# Patient Record
Sex: FEMALE | Race: ASIAN | NOT HISPANIC OR LATINO | URBAN - METROPOLITAN AREA
[De-identification: names, ages, dates, MRNs, and addresses within clinical notes are randomized per-mention and may not be internally consistent; named-entity substitution may affect disease eponyms.]

---

## 2017-07-10 ENCOUNTER — EMERGENCY (EMERGENCY)
Facility: HOSPITAL | Age: 37
LOS: 1 days | Discharge: HOME | End: 2017-07-10
Attending: EMERGENCY MEDICINE | Admitting: EMERGENCY MEDICINE
Payer: COMMERCIAL

## 2017-07-10 VITALS
HEART RATE: 95 BPM | TEMPERATURE: 98 F | SYSTOLIC BLOOD PRESSURE: 139 MMHG | OXYGEN SATURATION: 100 % | DIASTOLIC BLOOD PRESSURE: 94 MMHG | RESPIRATION RATE: 18 BRPM

## 2017-07-10 LAB
APTT BLD: 24 SEC — LOW (ref 27.5–37.4)
BASE EXCESS BLDV CALC-SCNC: 0.7 MMOL/L — SIGNIFICANT CHANGE UP
BASOPHILS # BLD AUTO: 0.05 K/UL — SIGNIFICANT CHANGE UP (ref 0–0.2)
BASOPHILS NFR BLD AUTO: 0.3 % — SIGNIFICANT CHANGE UP (ref 0–2)
BLOOD GAS VENOUS - CREATININE: 0.63 MG/DL — SIGNIFICANT CHANGE UP (ref 0.5–1.3)
CHLORIDE BLDV-SCNC: 99 MMOL/L — SIGNIFICANT CHANGE UP (ref 96–108)
EOSINOPHIL # BLD AUTO: 0.04 K/UL — SIGNIFICANT CHANGE UP (ref 0–0.5)
EOSINOPHIL NFR BLD AUTO: 0.3 % — SIGNIFICANT CHANGE UP (ref 0–6)
GAS PNL BLDV: 132 MMOL/L — LOW (ref 136–146)
GLUCOSE BLDV-MCNC: 92 — SIGNIFICANT CHANGE UP (ref 70–99)
HCO3 BLDV-SCNC: 23 MMOL/L — SIGNIFICANT CHANGE UP (ref 20–27)
HCT VFR BLD CALC: 41.1 % — SIGNIFICANT CHANGE UP (ref 34.5–45)
HCT VFR BLDV CALC: 42.6 % — SIGNIFICANT CHANGE UP (ref 34.5–45)
HGB BLD-MCNC: 13.9 G/DL — SIGNIFICANT CHANGE UP (ref 11.5–15.5)
HGB BLDV-MCNC: 13.9 G/DL — SIGNIFICANT CHANGE UP (ref 11.5–15.5)
IMM GRANULOCYTES # BLD AUTO: 0.09 # — SIGNIFICANT CHANGE UP
IMM GRANULOCYTES NFR BLD AUTO: 0.6 % — SIGNIFICANT CHANGE UP (ref 0–1.5)
INR BLD: 1.1 — SIGNIFICANT CHANGE UP (ref 0.88–1.17)
LACTATE BLDV-MCNC: 3.3 MMOL/L — HIGH (ref 0.5–2)
LYMPHOCYTES # BLD AUTO: 28.1 % — SIGNIFICANT CHANGE UP (ref 13–44)
LYMPHOCYTES # BLD AUTO: 4.28 K/UL — HIGH (ref 1–3.3)
MCHC RBC-ENTMCNC: 27.4 PG — SIGNIFICANT CHANGE UP (ref 27–34)
MCHC RBC-ENTMCNC: 33.8 % — SIGNIFICANT CHANGE UP (ref 32–36)
MCV RBC AUTO: 81.1 FL — SIGNIFICANT CHANGE UP (ref 80–100)
MONOCYTES # BLD AUTO: 0.76 K/UL — SIGNIFICANT CHANGE UP (ref 0–0.9)
MONOCYTES NFR BLD AUTO: 5 % — SIGNIFICANT CHANGE UP (ref 2–14)
NEUTROPHILS # BLD AUTO: 10.03 K/UL — HIGH (ref 1.8–7.4)
NEUTROPHILS NFR BLD AUTO: 65.7 % — SIGNIFICANT CHANGE UP (ref 43–77)
NRBC # FLD: 0 — SIGNIFICANT CHANGE UP
PCO2 BLDV: 52 MMHG — HIGH (ref 41–51)
PH BLDV: 7.32 PH — SIGNIFICANT CHANGE UP (ref 7.32–7.43)
PLATELET # BLD AUTO: 380 K/UL — SIGNIFICANT CHANGE UP (ref 150–400)
PMV BLD: 9.8 FL — SIGNIFICANT CHANGE UP (ref 7–13)
PO2 BLDV: 35 MMHG — SIGNIFICANT CHANGE UP (ref 35–40)
POTASSIUM BLDV-SCNC: 5.1 MMOL/L — HIGH (ref 3.4–4.5)
PROTHROM AB SERPL-ACNC: 12.3 SEC — SIGNIFICANT CHANGE UP (ref 9.8–13.1)
RBC # BLD: 5.07 M/UL — SIGNIFICANT CHANGE UP (ref 3.8–5.2)
RBC # FLD: 14.3 % — SIGNIFICANT CHANGE UP (ref 10.3–14.5)
REVIEW TO FOLLOW: YES — SIGNIFICANT CHANGE UP
SAO2 % BLDV: 51.6 % — LOW (ref 60–85)
WBC # BLD: 15.25 K/UL — HIGH (ref 3.8–10.5)
WBC # FLD AUTO: 15.25 K/UL — HIGH (ref 3.8–10.5)

## 2017-07-10 PROCEDURE — 99284 EMERGENCY DEPT VISIT MOD MDM: CPT

## 2017-07-10 RX ORDER — SODIUM CHLORIDE 9 MG/ML
1000 INJECTION INTRAMUSCULAR; INTRAVENOUS; SUBCUTANEOUS ONCE
Qty: 0 | Refills: 0 | Status: COMPLETED | OUTPATIENT
Start: 2017-07-10 | End: 2017-07-10

## 2017-07-10 RX ORDER — ONDANSETRON 8 MG/1
4 TABLET, FILM COATED ORAL ONCE
Qty: 0 | Refills: 0 | Status: COMPLETED | OUTPATIENT
Start: 2017-07-10 | End: 2017-07-10

## 2017-07-10 RX ORDER — SODIUM CHLORIDE 9 MG/ML
1000 INJECTION, SOLUTION INTRAVENOUS
Qty: 0 | Refills: 0 | Status: DISCONTINUED | OUTPATIENT
Start: 2017-07-10 | End: 2017-07-14

## 2017-07-10 RX ADMIN — ONDANSETRON 4 MILLIGRAM(S): 8 TABLET, FILM COATED ORAL at 22:51

## 2017-07-10 RX ADMIN — SODIUM CHLORIDE 1000 MILLILITER(S): 9 INJECTION INTRAMUSCULAR; INTRAVENOUS; SUBCUTANEOUS at 22:51

## 2017-07-10 NOTE — ED ADULT NURSE NOTE - CAS EDN DISCHARGE INTERVENTIONS
IV discontinued, cath removed intact/IV discontinued by MD Shore IV discontinued, cath removed intact

## 2017-07-10 NOTE — ED PROVIDER NOTE - OBJECTIVE STATEMENT
37 year old female , 8 weeks pregnant, in with 3-4 days of vomiting, unable to tolerate PO intake.  Sent in by Dr. cruz ob,gyn for dehydration and evaluation.  Denies any vaginal bleeding.  Does endorse some periumbilical/epigastric abdominal pain that started after the vomiting. 37 year old female , 8 weeks pregnant, in with 3-4 days of vomiting, unable to tolerate PO intake.  Sent in by Dr. cruz ob,gyn for dehydration and evaluation.  Denies any vaginal bleeding.  Does endorse some periumbilical/epigastric abdominal pain that started after the vomiting w/out radiation.    Patient denies cough, nasal congestion, abdominal pain, nausea, vomiting, recent antibiotic use, diarrhea, dysuria, urinary frequency, new rashes or injuries, headaches, neck stiffness, photophobia, and sick contacts. Pt is a 37 year old female , 8 weeks pregnant, in with 3-4 days of vomiting, unable to tolerate PO intake.  Sent in by Dr. cruz ob,gyn for dehydration and evaluation.  Denies any vaginal bleeding.  Does endorse some periumbilical/epigastric abdominal pain that started after the vomiting w/out radiation. No recent ETOH use, no vaginal discharge or bleeding.     Patient denies cough, nasal congestion, abdominal pain, nausea, vomiting, recent antibiotic use, diarrhea, dysuria, urinary frequency, new rashes or injuries, headaches, neck stiffness, photophobia, and sick contacts. Pt is a 37 year old female , 8 weeks pregnant, in with 3-4 days of vomiting, unable to tolerate PO intake.  Sent in by Dr. Watts ob-gyn for dehydration and evaluation.  Denies any vaginal bleeding.  Does endorse some periumbilical/epigastric abdominal pain that started after the vomiting w/out radiation. No recent ETOH use, no vaginal discharge or bleeding.     Patient denies cough, nasal congestion, abdominal pain, nausea, vomiting, recent antibiotic use, diarrhea, dysuria, urinary frequency, new rashes or injuries, headaches, neck stiffness, photophobia, and sick contacts.

## 2017-07-10 NOTE — ED PROVIDER NOTE - PLAN OF CARE
Follow-up with OB and take Diclegis as needed for nausea and vomiting. You were seen at the [facility] ER for abdominal pain nausea and vomiting. Your White Blood cells were slightly elevated likely due to pregnancy, but normal MRI w/out appendicitis. You are to follow-up with your OB in the next week for further evaluation. You are also to take an anti-nausea medication as needed and as prescribed. You are to return for persistent nausea and vomiting, abdominal pain, fevers or chills, or any other concerns.

## 2017-07-10 NOTE — ED ADULT NURSE NOTE - OBJECTIVE STATEMENT
Patient received in room 13. Pt. is A&O x3 and ambulatory at baseline. Pt. states she is 8 weeks pregnant and is c/o vomiting for the past 3-4 days with decreased PO intake. Pt. denies vaginal bleeding and cramping. Pt. has epigastric abdominal pain upon palpation with no radiation. Pt. denies SOB, chest pain, dizziness, weakness, urinary frequency, pain upon urination. 22 gauge IV inserted in right hand by KETAN Moctezuma. Labs drawn and sent. Family at bedside. VS as noted. MD heaton in progress. Will continue to monitor.

## 2017-07-10 NOTE — ED ADULT TRIAGE NOTE - CHIEF COMPLAINT QUOTE
states" I am vomiting since Thursday and I am 8 weeks pregnant" c/o pain to abdomen under belly button going to the back.

## 2017-07-10 NOTE — ED PROVIDER NOTE - PROGRESS NOTE DETAILS
MRI negative for appendicitis. Patient tolerated PO trial and feels much better. patient will be d/aminata with PRN nausea medication.

## 2017-07-10 NOTE — ED PROVIDER NOTE - CARE PLAN
Principal Discharge DX:	Gastroenteritis  Goal:	Follow-up with OB and take Diclegis as needed for nausea and vomiting.  Instructions for follow-up, activity and diet:	You were seen at the [facility] ER for abdominal pain nausea and vomiting. Your White Blood cells were slightly elevated likely due to pregnancy, but normal MRI w/out appendicitis. You are to follow-up with your OB in the next week for further evaluation. You are also to take an anti-nausea medication as needed and as prescribed. You are to return for persistent nausea and vomiting, abdominal pain, fevers or chills, or any other concerns.  Secondary Diagnosis:	Hyperemesis gravidarum Principal Discharge DX:	Gastroenteritis  Goal:	Follow-up with OB and take Diclegis as needed for nausea and vomiting.  Instructions for follow-up, activity and diet:	You were seen at the [facility] ER for abdominal pain nausea and vomiting. Your White Blood cells were slightly elevated likely due to pregnancy, but normal MRI w/out appendicitis. You are to follow-up with your OB in the next week for further evaluation. You are also to take an anti-nausea medication as needed and as prescribed. You are to return for persistent nausea and vomiting, abdominal pain, fevers or chills, or any other concerns.  Secondary Diagnosis:	Hyperemesis gravidarum

## 2017-07-10 NOTE — ED PROVIDER NOTE - ATTENDING CONTRIBUTION TO CARE
I, Ronda Larios M.D. have examined the patient and confirmed the essential components of the history, physical examination, diagnosis, and treatment plan. I agree with the patient's care as documented by the resident and amended herein by me. See note above for complete details of service.  37-year-old female  eight weeks referred by OB for intractable nausea and vomiting x several days. Pain is very umbilical and epigastric. No vaginal bleeding or discharge. Exam reveals epigastric, right upper and lower quadrant tenderness to palpation. Plan – supportive and symptomatic care. Labs. Urinalysis. Ultrasound imaging rule out biliary disease or appendicitis. If ultrasound nondiagnostic will perform MRI rule out appendicitis. Reassess.

## 2017-07-11 VITALS
DIASTOLIC BLOOD PRESSURE: 67 MMHG | HEART RATE: 74 BPM | OXYGEN SATURATION: 100 % | TEMPERATURE: 98 F | SYSTOLIC BLOOD PRESSURE: 107 MMHG | RESPIRATION RATE: 16 BRPM

## 2017-07-11 LAB
ALBUMIN SERPL ELPH-MCNC: 4.1 G/DL — SIGNIFICANT CHANGE UP (ref 3.3–5)
ALP SERPL-CCNC: 59 U/L — SIGNIFICANT CHANGE UP (ref 40–120)
ALT FLD-CCNC: 30 U/L — SIGNIFICANT CHANGE UP (ref 4–33)
APPEARANCE UR: SIGNIFICANT CHANGE UP
AST SERPL-CCNC: 32 U/L — SIGNIFICANT CHANGE UP (ref 4–32)
BILIRUB SERPL-MCNC: 0.5 MG/DL — SIGNIFICANT CHANGE UP (ref 0.2–1.2)
BILIRUB UR-MCNC: NEGATIVE — SIGNIFICANT CHANGE UP
BLD GP AB SCN SERPL QL: NEGATIVE — SIGNIFICANT CHANGE UP
BLOOD UR QL VISUAL: HIGH
BUN SERPL-MCNC: 9 MG/DL — SIGNIFICANT CHANGE UP (ref 7–23)
CALCIUM SERPL-MCNC: 9.5 MG/DL — SIGNIFICANT CHANGE UP (ref 8.4–10.5)
CHLORIDE SERPL-SCNC: 95 MMOL/L — LOW (ref 98–107)
CO2 SERPL-SCNC: 19 MMOL/L — LOW (ref 22–31)
COLOR SPEC: YELLOW — SIGNIFICANT CHANGE UP
CREAT SERPL-MCNC: 0.67 MG/DL — SIGNIFICANT CHANGE UP (ref 0.5–1.3)
GLUCOSE SERPL-MCNC: 92 MG/DL — SIGNIFICANT CHANGE UP (ref 70–99)
GLUCOSE UR-MCNC: NEGATIVE — SIGNIFICANT CHANGE UP
HCG SERPL-ACNC: SIGNIFICANT CHANGE UP MIU/ML
KETONES UR-MCNC: SIGNIFICANT CHANGE UP
LEUKOCYTE ESTERASE UR-ACNC: NEGATIVE — SIGNIFICANT CHANGE UP
LIDOCAIN IGE QN: 28.7 U/L — SIGNIFICANT CHANGE UP (ref 7–60)
MUCOUS THREADS # UR AUTO: SIGNIFICANT CHANGE UP
NITRITE UR-MCNC: NEGATIVE — SIGNIFICANT CHANGE UP
PH UR: 5.5 — SIGNIFICANT CHANGE UP (ref 4.6–8)
POTASSIUM SERPL-MCNC: 4.4 MMOL/L — SIGNIFICANT CHANGE UP (ref 3.5–5.3)
POTASSIUM SERPL-SCNC: 4.4 MMOL/L — SIGNIFICANT CHANGE UP (ref 3.5–5.3)
PROT SERPL-MCNC: 8 G/DL — SIGNIFICANT CHANGE UP (ref 6–8.3)
PROT UR-MCNC: 100 — HIGH
RBC CASTS # UR COMP ASSIST: SIGNIFICANT CHANGE UP (ref 0–?)
RH IG SCN BLD-IMP: POSITIVE — SIGNIFICANT CHANGE UP
SODIUM SERPL-SCNC: 137 MMOL/L — SIGNIFICANT CHANGE UP (ref 135–145)
SP GR SPEC: 1.03 — HIGH (ref 1–1.03)
SQUAMOUS # UR AUTO: SIGNIFICANT CHANGE UP
UROBILINOGEN FLD QL: 1 E.U. — SIGNIFICANT CHANGE UP (ref 0.1–0.2)
WBC UR QL: HIGH (ref 0–?)

## 2017-07-11 PROCEDURE — 76830 TRANSVAGINAL US NON-OB: CPT | Mod: 26

## 2017-07-11 PROCEDURE — 74181 MRI ABDOMEN W/O CONTRAST: CPT | Mod: 26

## 2017-07-11 PROCEDURE — 76705 ECHO EXAM OF ABDOMEN: CPT | Mod: 26,76

## 2017-07-11 RX ORDER — ONDANSETRON 8 MG/1
1 TABLET, FILM COATED ORAL
Qty: 9 | Refills: 0 | OUTPATIENT
Start: 2017-07-11 | End: 2017-07-14

## 2017-07-11 RX ADMIN — SODIUM CHLORIDE 100 MILLILITER(S): 9 INJECTION, SOLUTION INTRAVENOUS at 01:00

## 2017-07-12 LAB
BACTERIA UR CULT: SIGNIFICANT CHANGE UP
SPECIMEN SOURCE: SIGNIFICANT CHANGE UP

## 2017-07-13 NOTE — ED POST DISCHARGE NOTE - OTHER COMMUNICATION
Discussed results with patient.  Pt states her OBGYN is Dr. Zhong office # [phone].  Faxed results to office [phone].  Pt will contact OBGYN and discuss UCX results.

## 2017-07-21 PROBLEM — Z00.00 ENCOUNTER FOR PREVENTIVE HEALTH EXAMINATION: Status: ACTIVE | Noted: 2017-07-21

## 2017-08-10 ENCOUNTER — APPOINTMENT (OUTPATIENT)
Dept: ANTEPARTUM | Facility: CLINIC | Age: 37
End: 2017-08-10

## 2017-11-21 ENCOUNTER — OUTPATIENT (OUTPATIENT)
Dept: OUTPATIENT SERVICES | Facility: HOSPITAL | Age: 37
LOS: 1 days | End: 2017-11-21
Payer: COMMERCIAL

## 2017-11-21 DIAGNOSIS — Z3A.00 WEEKS OF GESTATION OF PREGNANCY NOT SPECIFIED: ICD-10-CM

## 2017-11-21 DIAGNOSIS — O26.899 OTHER SPECIFIED PREGNANCY RELATED CONDITIONS, UNSPECIFIED TRIMESTER: ICD-10-CM

## 2017-11-21 LAB
ALBUMIN SERPL ELPH-MCNC: 3.4 G/DL — SIGNIFICANT CHANGE UP (ref 3.3–5)
ALP SERPL-CCNC: 64 U/L — SIGNIFICANT CHANGE UP (ref 40–120)
ALT FLD-CCNC: 16 U/L — SIGNIFICANT CHANGE UP (ref 4–33)
APPEARANCE UR: CLEAR — SIGNIFICANT CHANGE UP
APTT BLD: 23.3 SEC — LOW (ref 27.5–37.4)
AST SERPL-CCNC: 13 U/L — SIGNIFICANT CHANGE UP (ref 4–32)
BASOPHILS # BLD AUTO: 0.06 K/UL — SIGNIFICANT CHANGE UP (ref 0–0.2)
BASOPHILS NFR BLD AUTO: 0.4 % — SIGNIFICANT CHANGE UP (ref 0–2)
BILIRUB SERPL-MCNC: 0.2 MG/DL — SIGNIFICANT CHANGE UP (ref 0.2–1.2)
BILIRUB UR-MCNC: NEGATIVE — SIGNIFICANT CHANGE UP
BLOOD UR QL VISUAL: HIGH
BUN SERPL-MCNC: 9 MG/DL — SIGNIFICANT CHANGE UP (ref 7–23)
CALCIUM SERPL-MCNC: 8.3 MG/DL — LOW (ref 8.4–10.5)
CHLORIDE SERPL-SCNC: 102 MMOL/L — SIGNIFICANT CHANGE UP (ref 98–107)
CO2 SERPL-SCNC: 24 MMOL/L — SIGNIFICANT CHANGE UP (ref 22–31)
COLOR SPEC: YELLOW — SIGNIFICANT CHANGE UP
CREAT ?TM UR-MCNC: 313.61 MG/DL — SIGNIFICANT CHANGE UP
CREAT SERPL-MCNC: 0.71 MG/DL — SIGNIFICANT CHANGE UP (ref 0.5–1.3)
EOSINOPHIL # BLD AUTO: 0.18 K/UL — SIGNIFICANT CHANGE UP (ref 0–0.5)
EOSINOPHIL NFR BLD AUTO: 1.3 % — SIGNIFICANT CHANGE UP (ref 0–6)
FIBRINOGEN PPP-MCNC: 530 MG/DL — HIGH (ref 310–510)
GLUCOSE SERPL-MCNC: 106 MG/DL — HIGH (ref 70–99)
GLUCOSE UR-MCNC: 100 — SIGNIFICANT CHANGE UP
HCT VFR BLD CALC: 32.1 % — LOW (ref 34.5–45)
HGB BLD-MCNC: 10.6 G/DL — LOW (ref 11.5–15.5)
IMM GRANULOCYTES # BLD AUTO: 0.3 # — SIGNIFICANT CHANGE UP
IMM GRANULOCYTES NFR BLD AUTO: 2.1 % — HIGH (ref 0–1.5)
INR BLD: 1.01 — SIGNIFICANT CHANGE UP (ref 0.88–1.17)
KETONES UR-MCNC: SIGNIFICANT CHANGE UP
LDH SERPL L TO P-CCNC: 191 U/L — SIGNIFICANT CHANGE UP (ref 135–225)
LEUKOCYTE ESTERASE UR-ACNC: HIGH
LYMPHOCYTES # BLD AUTO: 23.5 % — SIGNIFICANT CHANGE UP (ref 13–44)
LYMPHOCYTES # BLD AUTO: 3.32 K/UL — HIGH (ref 1–3.3)
MCHC RBC-ENTMCNC: 26.4 PG — LOW (ref 27–34)
MCHC RBC-ENTMCNC: 33 % — SIGNIFICANT CHANGE UP (ref 32–36)
MCV RBC AUTO: 80 FL — SIGNIFICANT CHANGE UP (ref 80–100)
MONOCYTES # BLD AUTO: 0.79 K/UL — SIGNIFICANT CHANGE UP (ref 0–0.9)
MONOCYTES NFR BLD AUTO: 5.6 % — SIGNIFICANT CHANGE UP (ref 2–14)
MUCOUS THREADS # UR AUTO: SIGNIFICANT CHANGE UP
NEUTROPHILS # BLD AUTO: 9.47 K/UL — HIGH (ref 1.8–7.4)
NEUTROPHILS NFR BLD AUTO: 67.1 % — SIGNIFICANT CHANGE UP (ref 43–77)
NITRITE UR-MCNC: NEGATIVE — SIGNIFICANT CHANGE UP
NON-SQ EPI CELLS # UR AUTO: <1 — SIGNIFICANT CHANGE UP
NRBC # FLD: 0 — SIGNIFICANT CHANGE UP
PH UR: 6 — SIGNIFICANT CHANGE UP (ref 4.6–8)
PLATELET # BLD AUTO: 321 K/UL — SIGNIFICANT CHANGE UP (ref 150–400)
PMV BLD: 9.7 FL — SIGNIFICANT CHANGE UP (ref 7–13)
POTASSIUM SERPL-MCNC: 4 MMOL/L — SIGNIFICANT CHANGE UP (ref 3.5–5.3)
POTASSIUM SERPL-SCNC: 4 MMOL/L — SIGNIFICANT CHANGE UP (ref 3.5–5.3)
PROT SERPL-MCNC: 6.9 G/DL — SIGNIFICANT CHANGE UP (ref 6–8.3)
PROT UR-MCNC: 100 — HIGH
PROT UR-MCNC: 34.6 MG/DL — SIGNIFICANT CHANGE UP
PROTHROM AB SERPL-ACNC: 11.3 SEC — SIGNIFICANT CHANGE UP (ref 9.8–13.1)
RBC # BLD: 4.01 M/UL — SIGNIFICANT CHANGE UP (ref 3.8–5.2)
RBC # FLD: 14.3 % — SIGNIFICANT CHANGE UP (ref 10.3–14.5)
RBC CASTS # UR COMP ASSIST: HIGH (ref 0–?)
SODIUM SERPL-SCNC: 138 MMOL/L — SIGNIFICANT CHANGE UP (ref 135–145)
SP GR SPEC: 1.04 — HIGH (ref 1–1.03)
SQUAMOUS # UR AUTO: SIGNIFICANT CHANGE UP
URATE SERPL-MCNC: 2.6 MG/DL — SIGNIFICANT CHANGE UP (ref 2.5–7)
UROBILINOGEN FLD QL: NORMAL E.U. — SIGNIFICANT CHANGE UP (ref 0.1–0.2)
WBC # BLD: 14.12 K/UL — HIGH (ref 3.8–10.5)
WBC # FLD AUTO: 14.12 K/UL — HIGH (ref 3.8–10.5)
WBC UR QL: HIGH (ref 0–?)

## 2017-11-21 PROCEDURE — 99214 OFFICE O/P EST MOD 30 MIN: CPT

## 2017-11-23 LAB
BACTERIA UR CULT: SIGNIFICANT CHANGE UP
SPECIMEN SOURCE: SIGNIFICANT CHANGE UP

## 2017-11-26 ENCOUNTER — TRANSCRIPTION ENCOUNTER (OUTPATIENT)
Age: 37
End: 2017-11-26

## 2018-02-24 ENCOUNTER — INPATIENT (INPATIENT)
Facility: HOSPITAL | Age: 38
LOS: 0 days | Discharge: HOME | End: 2018-02-25
Attending: OBSTETRICS & GYNECOLOGY | Admitting: OBSTETRICS & GYNECOLOGY

## 2018-02-24 VITALS — WEIGHT: 227.08 LBS | HEIGHT: 68 IN

## 2018-02-24 DIAGNOSIS — O48.0 POST-TERM PREGNANCY: ICD-10-CM

## 2018-02-24 LAB
ANISOCYTOSIS BLD QL: SIGNIFICANT CHANGE UP
BASOPHILS # BLD AUTO: 0.06 K/UL — SIGNIFICANT CHANGE UP (ref 0–0.2)
BASOPHILS NFR BLD AUTO: 0.6 % — SIGNIFICANT CHANGE UP (ref 0–2)
BASOPHILS NFR SPEC: 0.9 % — SIGNIFICANT CHANGE UP (ref 0–2)
BLD GP AB SCN SERPL QL: NEGATIVE — SIGNIFICANT CHANGE UP
EOSINOPHIL # BLD AUTO: 0.14 K/UL — SIGNIFICANT CHANGE UP (ref 0–0.5)
EOSINOPHIL NFR BLD AUTO: 1.4 % — SIGNIFICANT CHANGE UP (ref 0–6)
EOSINOPHIL NFR FLD: 3.8 % — SIGNIFICANT CHANGE UP (ref 0–6)
GIANT PLATELETS BLD QL SMEAR: PRESENT — SIGNIFICANT CHANGE UP
GLUCOSE BLDC GLUCOMTR-MCNC: 127 MG/DL — HIGH (ref 70–99)
GLUCOSE BLDC GLUCOMTR-MCNC: 61 MG/DL — LOW (ref 70–99)
GLUCOSE BLDC GLUCOMTR-MCNC: 78 MG/DL — SIGNIFICANT CHANGE UP (ref 70–99)
GLUCOSE BLDC GLUCOMTR-MCNC: 86 MG/DL — SIGNIFICANT CHANGE UP (ref 70–99)
HCT VFR BLD CALC: 30.2 % — LOW (ref 34.5–45)
HGB BLD-MCNC: 9.7 G/DL — LOW (ref 11.5–15.5)
IMM GRANULOCYTES # BLD AUTO: 0.22 # — SIGNIFICANT CHANGE UP
IMM GRANULOCYTES NFR BLD AUTO: 2.1 % — HIGH (ref 0–1.5)
LYMPHOCYTES # BLD AUTO: 3.23 K/UL — SIGNIFICANT CHANGE UP (ref 1–3.3)
LYMPHOCYTES # BLD AUTO: 31.1 % — SIGNIFICANT CHANGE UP (ref 13–44)
LYMPHOCYTES NFR SPEC AUTO: 23.8 % — SIGNIFICANT CHANGE UP (ref 13–44)
MCHC RBC-ENTMCNC: 23.6 PG — LOW (ref 27–34)
MCHC RBC-ENTMCNC: 32.1 % — SIGNIFICANT CHANGE UP (ref 32–36)
MCV RBC AUTO: 73.5 FL — LOW (ref 80–100)
MICROCYTES BLD QL: SLIGHT — SIGNIFICANT CHANGE UP
MONOCYTES # BLD AUTO: 0.46 K/UL — SIGNIFICANT CHANGE UP (ref 0–0.9)
MONOCYTES NFR BLD AUTO: 4.4 % — SIGNIFICANT CHANGE UP (ref 2–14)
MONOCYTES NFR BLD: 2.9 % — SIGNIFICANT CHANGE UP (ref 2–9)
MYELOCYTES NFR BLD: 1 % — HIGH (ref 0–0)
NEUTROPHIL AB SER-ACNC: 59 % — SIGNIFICANT CHANGE UP (ref 43–77)
NEUTROPHILS # BLD AUTO: 6.26 K/UL — SIGNIFICANT CHANGE UP (ref 1.8–7.4)
NEUTROPHILS NFR BLD AUTO: 60.4 % — SIGNIFICANT CHANGE UP (ref 43–77)
NEUTS BAND # BLD: 1 % — SIGNIFICANT CHANGE UP (ref 0–6)
NRBC # FLD: 0 — SIGNIFICANT CHANGE UP
PLATELET # BLD AUTO: 381 K/UL — SIGNIFICANT CHANGE UP (ref 150–400)
PLATELET COUNT - ESTIMATE: NORMAL — SIGNIFICANT CHANGE UP
PMV BLD: 10.7 FL — SIGNIFICANT CHANGE UP (ref 7–13)
POIKILOCYTOSIS BLD QL AUTO: SLIGHT — SIGNIFICANT CHANGE UP
RBC # BLD: 4.11 M/UL — SIGNIFICANT CHANGE UP (ref 3.8–5.2)
RBC # FLD: 18 % — HIGH (ref 10.3–14.5)
RH IG SCN BLD-IMP: POSITIVE — SIGNIFICANT CHANGE UP
SMUDGE CELLS # BLD: PRESENT — SIGNIFICANT CHANGE UP
T PALLIDUM AB TITR SER: NEGATIVE — SIGNIFICANT CHANGE UP
VARIANT LYMPHS # BLD: 7.6 % — SIGNIFICANT CHANGE UP
WBC # BLD: 10.37 K/UL — SIGNIFICANT CHANGE UP (ref 3.8–10.5)
WBC # FLD AUTO: 10.37 K/UL — SIGNIFICANT CHANGE UP (ref 3.8–10.5)

## 2018-02-24 RX ORDER — CITRIC ACID/SODIUM CITRATE 300-500 MG
15 SOLUTION, ORAL ORAL EVERY 4 HOURS
Qty: 0 | Refills: 0 | Status: DISCONTINUED | OUTPATIENT
Start: 2018-02-24 | End: 2018-02-25

## 2018-02-24 RX ORDER — AMPICILLIN TRIHYDRATE 250 MG
CAPSULE ORAL
Qty: 0 | Refills: 0 | Status: DISCONTINUED | OUTPATIENT
Start: 2018-02-24 | End: 2018-02-25

## 2018-02-24 RX ORDER — AMPICILLIN TRIHYDRATE 250 MG
1 CAPSULE ORAL EVERY 4 HOURS
Qty: 0 | Refills: 0 | Status: DISCONTINUED | OUTPATIENT
Start: 2018-02-24 | End: 2018-02-25

## 2018-02-24 RX ORDER — MONTELUKAST 4 MG/1
10 TABLET, CHEWABLE ORAL DAILY
Qty: 0 | Refills: 0 | Status: DISCONTINUED | OUTPATIENT
Start: 2018-02-24 | End: 2018-02-25

## 2018-02-24 RX ORDER — SODIUM CHLORIDE 9 MG/ML
1000 INJECTION, SOLUTION INTRAVENOUS
Qty: 0 | Refills: 0 | Status: DISCONTINUED | OUTPATIENT
Start: 2018-02-24 | End: 2018-02-25

## 2018-02-24 RX ORDER — OXYTOCIN 10 UNIT/ML
333.33 VIAL (ML) INJECTION
Qty: 20 | Refills: 0 | Status: DISCONTINUED | OUTPATIENT
Start: 2018-02-24 | End: 2018-02-25

## 2018-02-24 RX ORDER — AMPICILLIN TRIHYDRATE 250 MG
2 CAPSULE ORAL ONCE
Qty: 0 | Refills: 0 | Status: COMPLETED | OUTPATIENT
Start: 2018-02-24 | End: 2018-02-24

## 2018-02-24 RX ORDER — SODIUM CHLORIDE 9 MG/ML
1000 INJECTION, SOLUTION INTRAVENOUS ONCE
Qty: 0 | Refills: 0 | Status: DISCONTINUED | OUTPATIENT
Start: 2018-02-24 | End: 2018-02-25

## 2018-02-24 RX ORDER — ALBUTEROL 90 UG/1
2 AEROSOL, METERED ORAL EVERY 6 HOURS
Qty: 0 | Refills: 0 | Status: DISCONTINUED | OUTPATIENT
Start: 2018-02-24 | End: 2018-02-25

## 2018-02-24 RX ADMIN — Medication 208 GRAM(S): at 18:16

## 2018-02-24 RX ADMIN — Medication 15 MILLILITER(S): at 15:11

## 2018-02-24 RX ADMIN — Medication 208 GRAM(S): at 22:20

## 2018-02-24 RX ADMIN — Medication 216 GRAM(S): at 14:17

## 2018-02-24 RX ADMIN — SODIUM CHLORIDE 250 MILLILITER(S): 9 INJECTION, SOLUTION INTRAVENOUS at 14:01

## 2018-02-25 ENCOUNTER — TRANSCRIPTION ENCOUNTER (OUTPATIENT)
Age: 38
End: 2018-02-25

## 2018-02-25 LAB
GLUCOSE BLDC GLUCOMTR-MCNC: 89 MG/DL — SIGNIFICANT CHANGE UP (ref 70–99)
GLUCOSE BLDC GLUCOMTR-MCNC: 90 MG/DL — SIGNIFICANT CHANGE UP (ref 70–99)

## 2018-02-25 RX ORDER — ALBUTEROL 90 UG/1
2 AEROSOL, METERED ORAL
Qty: 0 | Refills: 0 | COMMUNITY
Start: 2018-02-25

## 2018-02-25 RX ORDER — SODIUM CHLORIDE 9 MG/ML
1000 INJECTION, SOLUTION INTRAVENOUS
Qty: 0 | Refills: 0 | Status: DISCONTINUED | OUTPATIENT
Start: 2018-02-25 | End: 2018-02-25

## 2018-02-25 RX ORDER — MONTELUKAST 4 MG/1
1 TABLET, CHEWABLE ORAL
Qty: 0 | Refills: 0 | COMMUNITY
Start: 2018-02-25

## 2018-02-25 RX ADMIN — Medication 208 GRAM(S): at 02:20

## 2018-02-25 RX ADMIN — MONTELUKAST 10 MILLIGRAM(S): 4 TABLET, CHEWABLE ORAL at 13:16

## 2018-02-25 RX ADMIN — Medication 208 GRAM(S): at 14:02

## 2018-02-25 RX ADMIN — Medication 208 GRAM(S): at 10:01

## 2018-02-25 RX ADMIN — Medication 208 GRAM(S): at 06:01

## 2018-02-25 NOTE — DISCHARGE NOTE ANTEPARTUM - HOSPITAL COURSE
37  at 40.1 admitted for IOL for GDMA1, FS well controlled, failed induction, sent home to return for induction at later date.

## 2018-02-25 NOTE — DISCHARGE NOTE ANTEPARTUM - CARE PLAN
Principal Discharge DX:	Diet controlled gestational diabetes mellitus (GDM) in third trimester  Goal:	wellness  Assessment and plan of treatment:	Follow up for induction at later date, as per Dr. Nowak

## 2018-02-25 NOTE — DISCHARGE NOTE ANTEPARTUM - CARE PROVIDER_API CALL
Yovany Nowak (MANNY), MaternalFetal Medicine; Obstetrics and Gynecology  [address] [place] [address]  [address]  [place] [address]  Phone: [phone]  Fax: [phone]

## 2018-02-25 NOTE — DISCHARGE NOTE ANTEPARTUM - PATIENT PORTAL LINK FT
You can access the [email or web address] Patient Portal, offered by [facility], by registering with the following website: [email or web address]

## 2018-02-27 ENCOUNTER — INPATIENT (INPATIENT)
Facility: HOSPITAL | Age: 38
LOS: 2 days | Discharge: HOME | End: 2018-03-02
Attending: OBSTETRICS & GYNECOLOGY | Admitting: OBSTETRICS & GYNECOLOGY

## 2018-02-27 DIAGNOSIS — O26.899 OTHER SPECIFIED PREGNANCY RELATED CONDITIONS, UNSPECIFIED TRIMESTER: ICD-10-CM

## 2018-02-27 RX ORDER — SODIUM CHLORIDE 9 MG/ML
1000 INJECTION, SOLUTION INTRAVENOUS
Qty: 0 | Refills: 0 | Status: DISCONTINUED | OUTPATIENT
Start: 2018-02-27 | End: 2018-02-28

## 2018-02-27 RX ORDER — AMPICILLIN TRIHYDRATE 250 MG
CAPSULE ORAL
Qty: 0 | Refills: 0 | Status: DISCONTINUED | OUTPATIENT
Start: 2018-02-28 | End: 2018-02-28

## 2018-02-27 RX ORDER — SODIUM CHLORIDE 9 MG/ML
1000 INJECTION, SOLUTION INTRAVENOUS
Qty: 0 | Refills: 0 | Status: DISCONTINUED | OUTPATIENT
Start: 2018-02-27 | End: 2018-03-01

## 2018-02-27 RX ORDER — AMPICILLIN TRIHYDRATE 250 MG
1 CAPSULE ORAL EVERY 4 HOURS
Qty: 0 | Refills: 0 | Status: DISCONTINUED | OUTPATIENT
Start: 2018-02-28 | End: 2018-02-28

## 2018-02-27 RX ORDER — OXYTOCIN 10 UNIT/ML
333.33 VIAL (ML) INJECTION
Qty: 20 | Refills: 0 | Status: DISCONTINUED | OUTPATIENT
Start: 2018-02-27 | End: 2018-02-28

## 2018-02-27 RX ORDER — SODIUM CHLORIDE 9 MG/ML
1000 INJECTION, SOLUTION INTRAVENOUS ONCE
Qty: 0 | Refills: 0 | Status: DISCONTINUED | OUTPATIENT
Start: 2018-02-27 | End: 2018-02-28

## 2018-02-27 RX ORDER — AMPICILLIN TRIHYDRATE 250 MG
2 CAPSULE ORAL ONCE
Qty: 0 | Refills: 0 | Status: COMPLETED | OUTPATIENT
Start: 2018-02-27 | End: 2018-02-28

## 2018-02-28 VITALS — WEIGHT: 227.08 LBS | HEIGHT: 68 IN

## 2018-02-28 LAB
ALBUMIN SERPL ELPH-MCNC: 3.3 G/DL — SIGNIFICANT CHANGE UP (ref 3.3–5)
ALP SERPL-CCNC: 143 U/L — HIGH (ref 40–120)
ALT FLD-CCNC: 9 U/L — SIGNIFICANT CHANGE UP (ref 4–33)
APPEARANCE UR: SIGNIFICANT CHANGE UP
APTT BLD: 23.6 SEC — LOW (ref 27.5–37.4)
AST SERPL-CCNC: 14 U/L — SIGNIFICANT CHANGE UP (ref 4–32)
BASOPHILS # BLD AUTO: 0.05 K/UL — SIGNIFICANT CHANGE UP (ref 0–0.2)
BASOPHILS NFR BLD AUTO: 0.4 % — SIGNIFICANT CHANGE UP (ref 0–2)
BILIRUB SERPL-MCNC: < 0.2 MG/DL — LOW (ref 0.2–1.2)
BILIRUB UR-MCNC: NEGATIVE — SIGNIFICANT CHANGE UP
BLD GP AB SCN SERPL QL: NEGATIVE — SIGNIFICANT CHANGE UP
BLOOD UR QL VISUAL: HIGH
BUN SERPL-MCNC: 9 MG/DL — SIGNIFICANT CHANGE UP (ref 7–23)
CALCIUM SERPL-MCNC: 8.3 MG/DL — LOW (ref 8.4–10.5)
CHLORIDE SERPL-SCNC: 103 MMOL/L — SIGNIFICANT CHANGE UP (ref 98–107)
CO2 SERPL-SCNC: 23 MMOL/L — SIGNIFICANT CHANGE UP (ref 22–31)
COLOR SPEC: YELLOW — SIGNIFICANT CHANGE UP
CREAT SERPL-MCNC: 0.62 MG/DL — SIGNIFICANT CHANGE UP (ref 0.5–1.3)
EOSINOPHIL # BLD AUTO: 0.22 K/UL — SIGNIFICANT CHANGE UP (ref 0–0.5)
EOSINOPHIL NFR BLD AUTO: 1.8 % — SIGNIFICANT CHANGE UP (ref 0–6)
FIBRINOGEN PPP-MCNC: 495.5 MG/DL — SIGNIFICANT CHANGE UP (ref 310–510)
GLUCOSE BLDC GLUCOMTR-MCNC: 107 MG/DL — HIGH (ref 70–99)
GLUCOSE BLDC GLUCOMTR-MCNC: 108 MG/DL — HIGH (ref 70–99)
GLUCOSE SERPL-MCNC: 95 MG/DL — SIGNIFICANT CHANGE UP (ref 70–99)
GLUCOSE UR-MCNC: NEGATIVE — SIGNIFICANT CHANGE UP
HCT VFR BLD CALC: 30.6 % — LOW (ref 34.5–45)
HGB BLD-MCNC: 9.9 G/DL — LOW (ref 11.5–15.5)
IMM GRANULOCYTES # BLD AUTO: 0.14 # — SIGNIFICANT CHANGE UP
IMM GRANULOCYTES NFR BLD AUTO: 1.1 % — SIGNIFICANT CHANGE UP (ref 0–1.5)
INR BLD: 0.97 — SIGNIFICANT CHANGE UP (ref 0.88–1.17)
KETONES UR-MCNC: SIGNIFICANT CHANGE UP
LDH SERPL L TO P-CCNC: 243 U/L — HIGH (ref 135–225)
LEUKOCYTE ESTERASE UR-ACNC: HIGH
LYMPHOCYTES # BLD AUTO: 28.1 % — SIGNIFICANT CHANGE UP (ref 13–44)
LYMPHOCYTES # BLD AUTO: 3.48 K/UL — HIGH (ref 1–3.3)
MCHC RBC-ENTMCNC: 23.8 PG — LOW (ref 27–34)
MCHC RBC-ENTMCNC: 32.4 % — SIGNIFICANT CHANGE UP (ref 32–36)
MCV RBC AUTO: 73.6 FL — LOW (ref 80–100)
MONOCYTES # BLD AUTO: 0.61 K/UL — SIGNIFICANT CHANGE UP (ref 0–0.9)
MONOCYTES NFR BLD AUTO: 4.9 % — SIGNIFICANT CHANGE UP (ref 2–14)
MUCOUS THREADS # UR AUTO: SIGNIFICANT CHANGE UP
NEUTROPHILS # BLD AUTO: 7.9 K/UL — HIGH (ref 1.8–7.4)
NEUTROPHILS NFR BLD AUTO: 63.7 % — SIGNIFICANT CHANGE UP (ref 43–77)
NITRITE UR-MCNC: NEGATIVE — SIGNIFICANT CHANGE UP
NRBC # FLD: 0 — SIGNIFICANT CHANGE UP
PH UR: 6 — SIGNIFICANT CHANGE UP (ref 4.6–8)
PLATELET # BLD AUTO: 326 K/UL — SIGNIFICANT CHANGE UP (ref 150–400)
PMV BLD: 10.3 FL — SIGNIFICANT CHANGE UP (ref 7–13)
POTASSIUM SERPL-MCNC: 4.2 MMOL/L — SIGNIFICANT CHANGE UP (ref 3.5–5.3)
POTASSIUM SERPL-SCNC: 4.2 MMOL/L — SIGNIFICANT CHANGE UP (ref 3.5–5.3)
PROT SERPL-MCNC: 6.5 G/DL — SIGNIFICANT CHANGE UP (ref 6–8.3)
PROT UR-MCNC: 20.5 MG/DL — SIGNIFICANT CHANGE UP
PROT UR-MCNC: 30 MG/DL — HIGH
PROTHROM AB SERPL-ACNC: 11.1 SEC — SIGNIFICANT CHANGE UP (ref 9.8–13.1)
RBC # BLD: 4.16 M/UL — SIGNIFICANT CHANGE UP (ref 3.8–5.2)
RBC # FLD: 18.3 % — HIGH (ref 10.3–14.5)
RBC CASTS # UR COMP ASSIST: >50 — HIGH (ref 0–?)
RH IG SCN BLD-IMP: POSITIVE — SIGNIFICANT CHANGE UP
SODIUM SERPL-SCNC: 139 MMOL/L — SIGNIFICANT CHANGE UP (ref 135–145)
SP GR SPEC: 1.02 — SIGNIFICANT CHANGE UP (ref 1–1.04)
SQUAMOUS # UR AUTO: SIGNIFICANT CHANGE UP
T PALLIDUM AB TITR SER: NEGATIVE — SIGNIFICANT CHANGE UP
URATE SERPL-MCNC: 2.8 MG/DL — SIGNIFICANT CHANGE UP (ref 2.5–7)
UROBILINOGEN FLD QL: NORMAL MG/DL — SIGNIFICANT CHANGE UP
WBC # BLD: 12.4 K/UL — HIGH (ref 3.8–10.5)
WBC # FLD AUTO: 12.4 K/UL — HIGH (ref 3.8–10.5)
WBC CLUMPS #/AREA URNS HPF: PRESENT — HIGH (ref 0–?)
WBC UR QL: >50 — HIGH (ref 0–?)
YEAST BUDDING # UR COMP ASSIST: SIGNIFICANT CHANGE UP

## 2018-02-28 RX ORDER — MONTELUKAST 4 MG/1
10 TABLET, CHEWABLE ORAL DAILY
Qty: 0 | Refills: 0 | Status: DISCONTINUED | OUTPATIENT
Start: 2018-02-28 | End: 2018-03-02

## 2018-02-28 RX ORDER — SIMETHICONE 80 MG/1
80 TABLET, CHEWABLE ORAL EVERY 6 HOURS
Qty: 0 | Refills: 0 | Status: DISCONTINUED | OUTPATIENT
Start: 2018-02-28 | End: 2018-03-02

## 2018-02-28 RX ORDER — SODIUM CHLORIDE 9 MG/ML
1000 INJECTION, SOLUTION INTRAVENOUS
Qty: 0 | Refills: 0 | Status: DISCONTINUED | OUTPATIENT
Start: 2018-02-28 | End: 2018-03-02

## 2018-02-28 RX ORDER — LANOLIN
1 OINTMENT (GRAM) TOPICAL EVERY 6 HOURS
Qty: 0 | Refills: 0 | Status: DISCONTINUED | OUTPATIENT
Start: 2018-02-28 | End: 2018-03-02

## 2018-02-28 RX ORDER — DIBUCAINE 1 %
1 OINTMENT (GRAM) RECTAL EVERY 4 HOURS
Qty: 0 | Refills: 0 | Status: DISCONTINUED | OUTPATIENT
Start: 2018-02-28 | End: 2018-02-28

## 2018-02-28 RX ORDER — SODIUM CHLORIDE 9 MG/ML
1000 INJECTION, SOLUTION INTRAVENOUS
Qty: 0 | Refills: 0 | Status: DISCONTINUED | OUTPATIENT
Start: 2018-02-28 | End: 2018-02-28

## 2018-02-28 RX ORDER — SODIUM CHLORIDE 9 MG/ML
3 INJECTION INTRAMUSCULAR; INTRAVENOUS; SUBCUTANEOUS EVERY 8 HOURS
Qty: 0 | Refills: 0 | Status: DISCONTINUED | OUTPATIENT
Start: 2018-02-28 | End: 2018-02-28

## 2018-02-28 RX ORDER — OXYTOCIN 10 UNIT/ML
2 VIAL (ML) INJECTION
Qty: 30 | Refills: 0 | Status: DISCONTINUED | OUTPATIENT
Start: 2018-02-28 | End: 2018-03-01

## 2018-02-28 RX ORDER — OXYCODONE HYDROCHLORIDE 5 MG/1
5 TABLET ORAL
Qty: 0 | Refills: 0 | Status: DISCONTINUED | OUTPATIENT
Start: 2018-02-28 | End: 2018-03-02

## 2018-02-28 RX ORDER — ACETAMINOPHEN 500 MG
975 TABLET ORAL EVERY 6 HOURS
Qty: 0 | Refills: 0 | Status: DISCONTINUED | OUTPATIENT
Start: 2018-02-28 | End: 2018-03-02

## 2018-02-28 RX ORDER — OXYCODONE HYDROCHLORIDE 5 MG/1
5 TABLET ORAL EVERY 4 HOURS
Qty: 0 | Refills: 0 | Status: DISCONTINUED | OUTPATIENT
Start: 2018-02-28 | End: 2018-03-02

## 2018-02-28 RX ORDER — DIPHENHYDRAMINE HCL 50 MG
25 CAPSULE ORAL EVERY 6 HOURS
Qty: 0 | Refills: 0 | Status: DISCONTINUED | OUTPATIENT
Start: 2018-02-28 | End: 2018-03-02

## 2018-02-28 RX ORDER — DOCUSATE SODIUM 100 MG
100 CAPSULE ORAL
Qty: 0 | Refills: 0 | Status: DISCONTINUED | OUTPATIENT
Start: 2018-02-28 | End: 2018-03-02

## 2018-02-28 RX ORDER — MAGNESIUM HYDROXIDE 400 MG/1
30 TABLET, CHEWABLE ORAL
Qty: 0 | Refills: 0 | Status: DISCONTINUED | OUTPATIENT
Start: 2018-02-28 | End: 2018-03-02

## 2018-02-28 RX ORDER — SODIUM CHLORIDE 9 MG/ML
1000 INJECTION, SOLUTION INTRAVENOUS ONCE
Qty: 0 | Refills: 0 | Status: DISCONTINUED | OUTPATIENT
Start: 2018-02-28 | End: 2018-02-28

## 2018-02-28 RX ORDER — OXYTOCIN 10 UNIT/ML
333.33 VIAL (ML) INJECTION
Qty: 20 | Refills: 0 | Status: DISCONTINUED | OUTPATIENT
Start: 2018-02-28 | End: 2018-02-28

## 2018-02-28 RX ORDER — IBUPROFEN 200 MG
600 TABLET ORAL EVERY 6 HOURS
Qty: 0 | Refills: 0 | Status: COMPLETED | OUTPATIENT
Start: 2018-02-28 | End: 2019-01-27

## 2018-02-28 RX ORDER — ONDANSETRON 8 MG/1
4 TABLET, FILM COATED ORAL ONCE
Qty: 0 | Refills: 0 | Status: COMPLETED | OUTPATIENT
Start: 2018-02-28 | End: 2018-02-28

## 2018-02-28 RX ORDER — PRAMOXINE HYDROCHLORIDE 150 MG/15G
1 AEROSOL, FOAM RECTAL EVERY 4 HOURS
Qty: 0 | Refills: 0 | Status: DISCONTINUED | OUTPATIENT
Start: 2018-02-28 | End: 2018-02-28

## 2018-02-28 RX ORDER — OXYTOCIN 10 UNIT/ML
41.67 VIAL (ML) INJECTION
Qty: 20 | Refills: 0 | Status: DISCONTINUED | OUTPATIENT
Start: 2018-02-28 | End: 2018-02-28

## 2018-02-28 RX ORDER — KETOROLAC TROMETHAMINE 30 MG/ML
30 SYRINGE (ML) INJECTION ONCE
Qty: 0 | Refills: 0 | Status: DISCONTINUED | OUTPATIENT
Start: 2018-02-28 | End: 2018-02-28

## 2018-02-28 RX ORDER — IBUPROFEN 200 MG
600 TABLET ORAL EVERY 6 HOURS
Qty: 0 | Refills: 0 | Status: DISCONTINUED | OUTPATIENT
Start: 2018-02-28 | End: 2018-03-02

## 2018-02-28 RX ORDER — AER TRAVELER 0.5 G/1
1 SOLUTION RECTAL; TOPICAL EVERY 4 HOURS
Qty: 0 | Refills: 0 | Status: DISCONTINUED | OUTPATIENT
Start: 2018-02-28 | End: 2018-02-28

## 2018-02-28 RX ORDER — LORATADINE 10 MG/1
5 TABLET ORAL DAILY
Qty: 0 | Refills: 0 | Status: DISCONTINUED | OUTPATIENT
Start: 2018-02-28 | End: 2018-03-02

## 2018-02-28 RX ORDER — ALBUTEROL 90 UG/1
2 AEROSOL, METERED ORAL EVERY 6 HOURS
Qty: 0 | Refills: 0 | Status: DISCONTINUED | OUTPATIENT
Start: 2018-02-28 | End: 2018-03-02

## 2018-02-28 RX ORDER — BUDESONIDE, MICRONIZED 100 %
2 POWDER (GRAM) MISCELLANEOUS DAILY
Qty: 0 | Refills: 0 | Status: DISCONTINUED | OUTPATIENT
Start: 2018-02-28 | End: 2018-03-02

## 2018-02-28 RX ORDER — CITRIC ACID/SODIUM CITRATE 300-500 MG
15 SOLUTION, ORAL ORAL EVERY 4 HOURS
Qty: 0 | Refills: 0 | Status: DISCONTINUED | OUTPATIENT
Start: 2018-02-28 | End: 2018-02-28

## 2018-02-28 RX ORDER — ALBUTEROL 90 UG/1
2.5 AEROSOL, METERED ORAL ONCE
Qty: 0 | Refills: 0 | Status: DISCONTINUED | OUTPATIENT
Start: 2018-02-28 | End: 2018-02-28

## 2018-02-28 RX ORDER — GLYCERIN ADULT
1 SUPPOSITORY, RECTAL RECTAL AT BEDTIME
Qty: 0 | Refills: 0 | Status: DISCONTINUED | OUTPATIENT
Start: 2018-02-28 | End: 2018-03-02

## 2018-02-28 RX ORDER — ACETAMINOPHEN 500 MG
975 TABLET ORAL EVERY 6 HOURS
Qty: 0 | Refills: 0 | Status: COMPLETED | OUTPATIENT
Start: 2018-02-28 | End: 2019-01-27

## 2018-02-28 RX ORDER — FLUTICASONE PROPIONATE 50 MCG
1 SPRAY, SUSPENSION NASAL DAILY
Qty: 0 | Refills: 0 | Status: DISCONTINUED | OUTPATIENT
Start: 2018-02-28 | End: 2018-03-02

## 2018-02-28 RX ORDER — OXYTOCIN 10 UNIT/ML
333.33 VIAL (ML) INJECTION
Qty: 20 | Refills: 0 | Status: COMPLETED | OUTPATIENT
Start: 2018-02-28

## 2018-02-28 RX ORDER — TETANUS TOXOID, REDUCED DIPHTHERIA TOXOID AND ACELLULAR PERTUSSIS VACCINE, ADSORBED 5; 2.5; 8; 8; 2.5 [IU]/.5ML; [IU]/.5ML; UG/.5ML; UG/.5ML; UG/.5ML
0.5 SUSPENSION INTRAMUSCULAR ONCE
Qty: 0 | Refills: 0 | Status: DISCONTINUED | OUTPATIENT
Start: 2018-02-28 | End: 2018-03-02

## 2018-02-28 RX ORDER — OXYTOCIN 10 UNIT/ML
333.33 VIAL (ML) INJECTION
Qty: 20 | Refills: 0 | Status: COMPLETED | OUTPATIENT
Start: 2018-02-28 | End: 2018-02-28

## 2018-02-28 RX ORDER — HYDROCORTISONE 1 %
1 OINTMENT (GRAM) TOPICAL EVERY 4 HOURS
Qty: 0 | Refills: 0 | Status: DISCONTINUED | OUTPATIENT
Start: 2018-02-28 | End: 2018-02-28

## 2018-02-28 RX ADMIN — SODIUM CHLORIDE 125 MILLILITER(S): 9 INJECTION, SOLUTION INTRAVENOUS at 07:40

## 2018-02-28 RX ADMIN — Medication 1000 MILLIUNIT(S)/MIN: at 14:23

## 2018-02-28 RX ADMIN — ONDANSETRON 4 MILLIGRAM(S): 8 TABLET, FILM COATED ORAL at 10:29

## 2018-02-28 RX ADMIN — Medication 216 GRAM(S): at 00:30

## 2018-02-28 RX ADMIN — Medication 600 MILLIGRAM(S): at 22:20

## 2018-02-28 RX ADMIN — ALBUTEROL 2 PUFF(S): 90 AEROSOL, METERED ORAL at 12:58

## 2018-02-28 RX ADMIN — Medication 108 GRAM(S): at 04:32

## 2018-02-28 RX ADMIN — Medication 30 MILLIGRAM(S): at 14:45

## 2018-02-28 RX ADMIN — Medication 125 MILLIUNIT(S)/MIN: at 14:54

## 2018-02-28 RX ADMIN — Medication 30 MILLIGRAM(S): at 15:31

## 2018-02-28 RX ADMIN — Medication 975 MILLIGRAM(S): at 22:20

## 2018-02-28 RX ADMIN — Medication 108 GRAM(S): at 08:49

## 2018-02-28 RX ADMIN — SODIUM CHLORIDE 125 MILLILITER(S): 9 INJECTION, SOLUTION INTRAVENOUS at 07:39

## 2018-02-28 RX ADMIN — Medication 975 MILLIGRAM(S): at 21:50

## 2018-02-28 RX ADMIN — SODIUM CHLORIDE 250 MILLILITER(S): 9 INJECTION, SOLUTION INTRAVENOUS at 00:46

## 2018-02-28 RX ADMIN — Medication 2 MILLIUNIT(S)/MIN: at 10:42

## 2018-02-28 RX ADMIN — Medication 600 MILLIGRAM(S): at 21:50

## 2018-02-28 RX ADMIN — Medication 108 GRAM(S): at 12:32

## 2018-03-01 RX ADMIN — Medication 975 MILLIGRAM(S): at 10:24

## 2018-03-01 RX ADMIN — Medication 600 MILLIGRAM(S): at 18:53

## 2018-03-01 RX ADMIN — Medication 1 TABLET(S): at 10:25

## 2018-03-01 RX ADMIN — Medication 600 MILLIGRAM(S): at 18:20

## 2018-03-01 RX ADMIN — Medication 600 MILLIGRAM(S): at 10:24

## 2018-03-01 RX ADMIN — Medication 975 MILLIGRAM(S): at 18:53

## 2018-03-01 RX ADMIN — Medication 600 MILLIGRAM(S): at 04:15

## 2018-03-01 RX ADMIN — Medication 600 MILLIGRAM(S): at 23:42

## 2018-03-01 RX ADMIN — Medication 975 MILLIGRAM(S): at 11:00

## 2018-03-01 RX ADMIN — Medication 975 MILLIGRAM(S): at 04:15

## 2018-03-01 RX ADMIN — Medication 600 MILLIGRAM(S): at 11:00

## 2018-03-01 RX ADMIN — Medication 975 MILLIGRAM(S): at 23:43

## 2018-03-01 RX ADMIN — Medication 975 MILLIGRAM(S): at 18:20

## 2018-03-01 RX ADMIN — Medication 600 MILLIGRAM(S): at 04:50

## 2018-03-01 RX ADMIN — Medication 975 MILLIGRAM(S): at 04:50

## 2018-03-01 NOTE — LACTATION INITIAL EVALUATION - LACTATION INTERVENTIONS
initiate skin to skin/assisted pt. to put baby to both breasts in football hold position with deep latch and sucking with stimulation; she is awkward with hand placement and required reinforcement re: how to support baby and breast and rolled up receiving blanket to support breast

## 2018-03-01 NOTE — LACTATION INITIAL EVALUATION - INTERVENTION OUTCOME
reviewed wet and soiled diaper log, feeding cues, feeding on demand, safe sleep practices, supply and demand, manual expression of colostrum and safe skin to skin; encouraged to ask for assistance with breastfeeding as needed

## 2018-03-02 ENCOUNTER — TRANSCRIPTION ENCOUNTER (OUTPATIENT)
Age: 38
End: 2018-03-02

## 2018-03-02 VITALS
OXYGEN SATURATION: 100 % | HEART RATE: 88 BPM | SYSTOLIC BLOOD PRESSURE: 124 MMHG | RESPIRATION RATE: 17 BRPM | TEMPERATURE: 98 F | DIASTOLIC BLOOD PRESSURE: 65 MMHG

## 2018-03-02 RX ORDER — IBUPROFEN 200 MG
1 TABLET ORAL
Qty: 0 | Refills: 0 | DISCHARGE
Start: 2018-03-02

## 2018-03-02 RX ADMIN — Medication 600 MILLIGRAM(S): at 00:13

## 2018-03-02 RX ADMIN — Medication 975 MILLIGRAM(S): at 00:13

## 2018-03-02 NOTE — DISCHARGE NOTE OB - MEDICATION SUMMARY - MEDICATIONS TO TAKE
I will START or STAY ON the medications listed below when I get home from the hospital:    ibuprofen 600 mg oral tablet  -- 1 tab(s) by mouth every 6 hours  -- Indication: For Encounter for full-term uncomplicated delivery    Prenatal Multivitamins with Folic Acid 1 mg oral tablet  -- 1 tab(s) by mouth once a day  -- Indication: For Other pregnancy-related conditions, antepartum

## 2018-03-02 NOTE — DISCHARGE NOTE OB - PATIENT PORTAL LINK FT
You can access the [facility] Patient Portal, offered by [facility], by registering with the following website: [email or web address]

## 2018-03-02 NOTE — DISCHARGE NOTE OB - MATERIALS PROVIDED
[facility] [place] Screening Program/Breastfeeding Log/Guide to Postpartum Care/[place]  Immunization Record

## 2018-03-02 NOTE — DISCHARGE NOTE OB - MEDICATION SUMMARY - MEDICATIONS TO STOP TAKING
I will STOP taking the medications listed below when I get home from the hospital:    Zofran ODT 4 mg oral tablet, disintegrating  -- 1 tab(s) by mouth every 8 hours as needed for nausea

## 2018-03-28 NOTE — ED ADULT NURSE REASSESSMENT NOTE - NS ED NURSE REASSESS COMMENT FT1
No acute distress at present. Pt. able to tolerate PO intake. Pt. is being discharged.
Pt. remains at MRI at present. Will assess upon return to ED.
Pt. returned from MRI. No acute distress at present. Family at bedside. Fluids infusing as per order. Will continue to monitor.
Pt. returned from ultrasound. VS as noted. Family at bedside. Fluids infusing as per order. No acute distress at present. Will continue to monitor.
Report received from break coverage KETAN Moctezuma. No acute distress at present. Pt. transported to MRI.
negative...

## 2018-07-11 NOTE — DISCHARGE NOTE OB - BREASTFEEDING PROVIDES MATERNAL HEALTH BENEFITS, DECREASED PREMENOPAUSAL BREAST CANCER, OVARIAN CANCER AND TYPE II DIABETES MELLITUS
I reviewed the H&P, I examined the patient, and there are no changes in the patient's condition.     Statement Selected

## 2018-10-03 NOTE — ED PROVIDER NOTE - MEDICAL DECISION MAKING DETAILS
[Mucoid Discharge] : mucoid discharge [Inflamed Nasal Mucosa] : inflamed nasal mucosa [Capillary Refill <2s] : capillary refill < 2s [NL] : warm Pt is a 37 year old female , 8 weeks pregnant confirmed IUP who p/w N/V and abdominal pain w/ RLQ and RUQ pain concerning for possible Lala vs Appy vs hyperemesis of pregnancy will order CBC, CMP, Lipase, UA, urine culture, VBG w/ lactate, PT/INR, aPTT, Type and screen, IVFs, zofran, IV tylenol, Will start with RUQ and RLQ US and TVUS and if nondiagnostic may need to proceed with MRI of abdomen to r/o Appy.

## 2019-02-11 ENCOUNTER — EMERGENCY (EMERGENCY)
Facility: HOSPITAL | Age: 39
LOS: 1 days | Discharge: HOME | End: 2019-02-11
Attending: EMERGENCY MEDICINE | Admitting: EMERGENCY MEDICINE
Payer: COMMERCIAL

## 2019-02-11 VITALS
RESPIRATION RATE: 16 BRPM | DIASTOLIC BLOOD PRESSURE: 103 MMHG | TEMPERATURE: 98 F | HEART RATE: 101 BPM | OXYGEN SATURATION: 98 % | SYSTOLIC BLOOD PRESSURE: 145 MMHG

## 2019-02-11 PROCEDURE — 99283 EMERGENCY DEPT VISIT LOW MDM: CPT | Mod: 25

## 2019-02-11 PROCEDURE — 99053 MED SERV 10PM-8AM 24 HR FAC: CPT

## 2019-02-11 NOTE — ED ADULT TRIAGE NOTE - CHIEF COMPLAINT QUOTE
Pt w/ hx of asthma, never intubated c/o mid abdominal pain since this afternoon with 1 episode of vomiting, and hematuria, Pain is worse with urination.  Pt evaluated at urgent care where Pt states "protein blood and elevated glucose" found in urine.  LMP 2 weeks ago

## 2019-02-12 VITALS
OXYGEN SATURATION: 98 % | TEMPERATURE: 98 F | SYSTOLIC BLOOD PRESSURE: 139 MMHG | RESPIRATION RATE: 18 BRPM | DIASTOLIC BLOOD PRESSURE: 95 MMHG | HEART RATE: 80 BPM

## 2019-02-12 LAB
ALBUMIN SERPL ELPH-MCNC: 4.1 G/DL — SIGNIFICANT CHANGE UP (ref 3.3–5)
ALP SERPL-CCNC: 83 U/L — SIGNIFICANT CHANGE UP (ref 40–120)
ALT FLD-CCNC: 18 U/L — SIGNIFICANT CHANGE UP (ref 4–33)
ANION GAP SERPL CALC-SCNC: 13 MMO/L — SIGNIFICANT CHANGE UP (ref 7–14)
APPEARANCE UR: SIGNIFICANT CHANGE UP
AST SERPL-CCNC: 21 U/L — SIGNIFICANT CHANGE UP (ref 4–32)
BACTERIA # UR AUTO: HIGH
BASOPHILS # BLD AUTO: 0.09 K/UL — SIGNIFICANT CHANGE UP (ref 0–0.2)
BASOPHILS NFR BLD AUTO: 0.7 % — SIGNIFICANT CHANGE UP (ref 0–2)
BILIRUB SERPL-MCNC: < 0.2 MG/DL — LOW (ref 0.2–1.2)
BILIRUB UR-MCNC: SIGNIFICANT CHANGE UP
BLOOD UR QL VISUAL: SIGNIFICANT CHANGE UP
BUN SERPL-MCNC: 14 MG/DL — SIGNIFICANT CHANGE UP (ref 7–23)
CALCIUM SERPL-MCNC: 8.9 MG/DL — SIGNIFICANT CHANGE UP (ref 8.4–10.5)
CHLORIDE SERPL-SCNC: 101 MMOL/L — SIGNIFICANT CHANGE UP (ref 98–107)
CK SERPL-CCNC: 170 U/L — SIGNIFICANT CHANGE UP (ref 25–170)
CO2 SERPL-SCNC: 23 MMOL/L — SIGNIFICANT CHANGE UP (ref 22–31)
COLOR SPEC: SIGNIFICANT CHANGE UP
CREAT SERPL-MCNC: 0.63 MG/DL — SIGNIFICANT CHANGE UP (ref 0.5–1.3)
EOSINOPHIL # BLD AUTO: 0.3 K/UL — SIGNIFICANT CHANGE UP (ref 0–0.5)
EOSINOPHIL NFR BLD AUTO: 2.2 % — SIGNIFICANT CHANGE UP (ref 0–6)
GLUCOSE SERPL-MCNC: 108 MG/DL — HIGH (ref 70–99)
GLUCOSE UR-MCNC: NEGATIVE — SIGNIFICANT CHANGE UP
HCT VFR BLD CALC: 37.9 % — SIGNIFICANT CHANGE UP (ref 34.5–45)
HGB BLD-MCNC: 12.6 G/DL — SIGNIFICANT CHANGE UP (ref 11.5–15.5)
IMM GRANULOCYTES NFR BLD AUTO: 0.4 % — SIGNIFICANT CHANGE UP (ref 0–1.5)
KETONES UR-MCNC: SIGNIFICANT CHANGE UP
LEUKOCYTE ESTERASE UR-ACNC: SIGNIFICANT CHANGE UP
LIDOCAIN IGE QN: 39.3 U/L — SIGNIFICANT CHANGE UP (ref 7–60)
LYMPHOCYTES # BLD AUTO: 34.9 % — SIGNIFICANT CHANGE UP (ref 13–44)
LYMPHOCYTES # BLD AUTO: 4.8 K/UL — HIGH (ref 1–3.3)
MCHC RBC-ENTMCNC: 26.6 PG — LOW (ref 27–34)
MCHC RBC-ENTMCNC: 33.2 % — SIGNIFICANT CHANGE UP (ref 32–36)
MCV RBC AUTO: 80.1 FL — SIGNIFICANT CHANGE UP (ref 80–100)
MONOCYTES # BLD AUTO: 0.63 K/UL — SIGNIFICANT CHANGE UP (ref 0–0.9)
MONOCYTES NFR BLD AUTO: 4.6 % — SIGNIFICANT CHANGE UP (ref 2–14)
NEUTROPHILS # BLD AUTO: 7.89 K/UL — HIGH (ref 1.8–7.4)
NEUTROPHILS NFR BLD AUTO: 57.2 % — SIGNIFICANT CHANGE UP (ref 43–77)
NITRITE UR-MCNC: POSITIVE — SIGNIFICANT CHANGE UP
NRBC # FLD: 0.03 K/UL — LOW (ref 25–125)
PH UR: 6 — SIGNIFICANT CHANGE UP (ref 5–8)
PLATELET # BLD AUTO: 352 K/UL — SIGNIFICANT CHANGE UP (ref 150–400)
PMV BLD: 9.5 FL — SIGNIFICANT CHANGE UP (ref 7–13)
POTASSIUM SERPL-MCNC: 4.6 MMOL/L — SIGNIFICANT CHANGE UP (ref 3.5–5.3)
POTASSIUM SERPL-SCNC: 4.6 MMOL/L — SIGNIFICANT CHANGE UP (ref 3.5–5.3)
PROT SERPL-MCNC: 7.5 G/DL — SIGNIFICANT CHANGE UP (ref 6–8.3)
PROT UR-MCNC: 300 — HIGH
RBC # BLD: 4.73 M/UL — SIGNIFICANT CHANGE UP (ref 3.8–5.2)
RBC # FLD: 13.2 % — SIGNIFICANT CHANGE UP (ref 10.3–14.5)
RBC CASTS # UR COMP ASSIST: >50 — HIGH (ref 0–?)
SODIUM SERPL-SCNC: 137 MMOL/L — SIGNIFICANT CHANGE UP (ref 135–145)
SP GR SPEC: 1.03 — SIGNIFICANT CHANGE UP (ref 1–1.04)
UROBILINOGEN FLD QL: 1 — SIGNIFICANT CHANGE UP
WBC # BLD: 13.77 K/UL — HIGH (ref 3.8–10.5)
WBC # FLD AUTO: 13.77 K/UL — HIGH (ref 3.8–10.5)
WBC UR QL: SIGNIFICANT CHANGE UP (ref 0–?)
YEAST BUDDING # UR COMP ASSIST: SIGNIFICANT CHANGE UP

## 2019-02-12 RX ORDER — CEPHALEXIN 500 MG
500 CAPSULE ORAL ONCE
Qty: 0 | Refills: 0 | Status: COMPLETED | OUTPATIENT
Start: 2019-02-12 | End: 2019-02-12

## 2019-02-12 RX ORDER — FLUCONAZOLE 150 MG/1
150 TABLET ORAL ONCE
Qty: 0 | Refills: 0 | Status: COMPLETED | OUTPATIENT
Start: 2019-02-12 | End: 2019-02-12

## 2019-02-12 RX ORDER — CEPHALEXIN 500 MG
1 CAPSULE ORAL
Qty: 14 | Refills: 0
Start: 2019-02-12 | End: 2019-02-18

## 2019-02-12 RX ADMIN — FLUCONAZOLE 150 MILLIGRAM(S): 150 TABLET ORAL at 05:05

## 2019-02-12 RX ADMIN — Medication 500 MILLIGRAM(S): at 05:05

## 2019-02-12 NOTE — ED PROVIDER NOTE - MEDICAL DECISION MAKING DETAILS
38F with hematuria and urinary frequency/pain with urination. Most consistent with urinary tract infection, but will also assess cbc/bmp/ck to evaluate for renal dysfunction or rhabdomyolysis as alternate cause.

## 2019-02-12 NOTE — ED PROVIDER NOTE - OBJECTIVE STATEMENT
38F PMH asthma p/w 1 day pain with urination, now with dark colored urine x 1 day. Also notes several days of urinary frequency preceding pain. Went to urgent care and told her urine had blood, glucose, and protein so sent to ED for eval. No back/flank pain. +nausea, no vomiting. No fever. No trauma. NO recent heavy lifting, no muscle pains.

## 2019-02-12 NOTE — ED PROVIDER NOTE - PROGRESS NOTE DETAILS
UA with possible infection (UCx sent), labs normal. Will treat for presumed UTI, given strict return precautions, will f/u with PMD for repeat evaluation after treatment.

## 2019-02-12 NOTE — ED ADULT NURSE NOTE - OBJECTIVE STATEMENT
Pt is a 38 year old female reporting mid abdominal pain. Pt reports pain as sharp. Pt denies radiation. Pt reports dysuria and hematuria. Pt denies fever, chills, vomiting or diarrehaPt reports nausea starting today. Pt is a 38 year old female reporting mid abdominal pain. Pt reports pain as sharp. Pt denies radiation. Pt reports dysuria and hematuria. Pt denies fever, chills, vomiting or diarrhea. Pt reports nausea starting today. Pt is AOX4. Pt denies chest pain, SOB. Pt respirations are even and unlabored. 20 g iv placed in left hand, labs drawn, pending review, will continue to monitor.

## 2019-02-12 NOTE — ED PROVIDER NOTE - NSFOLLOWUPINSTRUCTIONS_ED_ALL_ED_FT
1.) Urine shows possible infection; urine culture was sent and will be back in 48 hours with type of bacteria causing infection. If there is an issue with the antibiotic you were given, you will receive a phone call  2.) Prescription for antibiotics sent to your pharmacy  3.) Drink plenty of fluids  4.) Bloodwork normal (kidneys, electrolytes, blood counts)  5.) Follow up with your primary care physician after you finish antibiotics for repeat testing and further evaluation

## 2019-02-12 NOTE — ED PROVIDER NOTE - ATTENDING CONTRIBUTION TO CARE
HPI: 38F PMH asthma p/w 1 day pain with urination, now with dark colored urine x 1 day. Also notes several days of urinary frequency preceding pain. Went to urgent care and told her urine had blood, glucose, and protein so sent to ED for eval. No back/flank pain. +nausea, no vomiting. No fever. No trauma. NO recent heavy lifting, no muscle pains.   EXAM: Abd obese, tender in periumbilical and suprapubic without masses, no CVAT and neg mcburneys, neg murphys, lungs ctab.   MDM: concern for UTI, possible pyelo but no systemic symptoms, unlikely colitis vs mass vs appy vs chandler. Will obtain labs, UA/culture hold imaging at this time and provide meds and IVF and reassess.

## 2019-02-12 NOTE — ED ADULT NURSE NOTE - NSIMPLEMENTINTERV_GEN_ALL_ED
Implemented All Universal Safety Interventions:  [place] to call system. Call bell, personal items and telephone within reach. Instruct patient to call for assistance. Room bathroom lighting operational. Non-slip footwear when patient is off stretcher. Physically safe environment: no spills, clutter or unnecessary equipment. Stretcher in lowest position, wheels locked, appropriate side rails in place.

## 2019-02-13 LAB — SPECIMEN SOURCE: SIGNIFICANT CHANGE UP

## 2019-02-14 LAB
-  AMIKACIN: SIGNIFICANT CHANGE UP
-  AMPICILLIN/SULBACTAM: SIGNIFICANT CHANGE UP
-  AMPICILLIN: SIGNIFICANT CHANGE UP
-  AZTREONAM: SIGNIFICANT CHANGE UP
-  CEFAZOLIN: SIGNIFICANT CHANGE UP
-  CEFEPIME: SIGNIFICANT CHANGE UP
-  CEFOXITIN: SIGNIFICANT CHANGE UP
-  CEFTAZIDIME: SIGNIFICANT CHANGE UP
-  CEFTRIAXONE: SIGNIFICANT CHANGE UP
-  CIPROFLOXACIN: SIGNIFICANT CHANGE UP
-  ERTAPENEM: SIGNIFICANT CHANGE UP
-  GENTAMICIN: SIGNIFICANT CHANGE UP
-  IMIPENEM: SIGNIFICANT CHANGE UP
-  LEVOFLOXACIN: SIGNIFICANT CHANGE UP
-  MEROPENEM: SIGNIFICANT CHANGE UP
-  NITROFURANTOIN: SIGNIFICANT CHANGE UP
-  PIPERACILLIN/TAZOBACTAM: SIGNIFICANT CHANGE UP
-  TIGECYCLINE: SIGNIFICANT CHANGE UP
-  TOBRAMYCIN: SIGNIFICANT CHANGE UP
-  TRIMETHOPRIM/SULFAMETHOXAZOLE: SIGNIFICANT CHANGE UP
BACTERIA UR CULT: SIGNIFICANT CHANGE UP
METHOD TYPE: SIGNIFICANT CHANGE UP
ORGANISM # SPEC MICROSCOPIC CNT: SIGNIFICANT CHANGE UP
ORGANISM # SPEC MICROSCOPIC CNT: SIGNIFICANT CHANGE UP

## 2019-12-10 NOTE — PATIENT PROFILE OB - PRESSURE ULCER(S)
-On-going use of methamphetamines.   -Prior hepatitis screening positive for HepC in 10/2019.     Plan:   -Counseled on importance of cessation.   -Screening for HIV ordered.   -Monitor for signs of withdrawal for other substances.      no

## 2020-07-18 ENCOUNTER — APPOINTMENT (OUTPATIENT)
Dept: OBGYN | Facility: CLINIC | Age: 40
End: 2020-07-18
Payer: COMMERCIAL

## 2020-07-18 PROCEDURE — 99395 PREV VISIT EST AGE 18-39: CPT

## 2020-09-17 ENCOUNTER — APPOINTMENT (OUTPATIENT)
Dept: ANTEPARTUM | Facility: CLINIC | Age: 40
End: 2020-09-17
Payer: COMMERCIAL

## 2020-09-17 PROCEDURE — 76856 US EXAM PELVIC COMPLETE: CPT | Mod: 59

## 2020-09-17 PROCEDURE — 76830 TRANSVAGINAL US NON-OB: CPT

## 2021-07-02 ENCOUNTER — TRANSCRIPTION ENCOUNTER (OUTPATIENT)
Age: 41
End: 2021-07-02

## 2022-06-06 NOTE — ED PROVIDER NOTE - CPE EDP RESP NORM
Current Issues/Problems reviewed on call: CM Case Review      Details for Interventions/Education completed on call: Patient remains admitted to [facility] for evaluation of LLQ pain. CM to follow up post discharge.      Screening completed for COVID -19 using the [facility] Symptom . Screening is Negative     Time Frame for Follow up: 1 week      Plan for Next Call:  CM to follow up on Discharge      Care Plan reviewed with Tracy and she agrees with the plan of care and the call follow up plan.        Care Plan Reviewed with SEBAS NAZARIO on 06/06/22 via EMR   normal...

## 2023-07-31 ENCOUNTER — APPOINTMENT (OUTPATIENT)
Dept: SURGERY | Facility: CLINIC | Age: 43
End: 2023-07-31
Payer: COMMERCIAL

## 2023-07-31 VITALS
HEART RATE: 111 BPM | TEMPERATURE: 98 F | RESPIRATION RATE: 17 BRPM | SYSTOLIC BLOOD PRESSURE: 144 MMHG | OXYGEN SATURATION: 98 % | HEIGHT: 68 IN | BODY MASS INDEX: 32.13 KG/M2 | DIASTOLIC BLOOD PRESSURE: 86 MMHG | WEIGHT: 212 LBS

## 2023-07-31 DIAGNOSIS — K60.2 ANAL FISSURE, UNSPECIFIED: ICD-10-CM

## 2023-07-31 DIAGNOSIS — K64.4 RESIDUAL HEMORRHOIDAL SKIN TAGS: ICD-10-CM

## 2023-07-31 PROCEDURE — 99204 OFFICE O/P NEW MOD 45 MIN: CPT

## 2023-07-31 RX ORDER — DUPILUMAB 200 MG/1.14ML
INJECTION, SOLUTION SUBCUTANEOUS
Refills: 0 | Status: ACTIVE | COMMUNITY

## 2023-07-31 RX ORDER — TIRZEPATIDE 7.5 MG/.5ML
INJECTION, SOLUTION SUBCUTANEOUS
Refills: 0 | Status: ACTIVE | COMMUNITY

## 2023-07-31 RX ORDER — NITROGLYCERIN 20 MG/G
2 OINTMENT TOPICAL
Qty: 1 | Refills: 0 | Status: ACTIVE | COMMUNITY
Start: 2023-07-31 | End: 1900-01-01

## 2023-08-01 PROBLEM — K64.4 EXTERNAL HEMORRHOID: Status: ACTIVE | Noted: 2023-08-01

## 2023-08-01 PROBLEM — K60.2 ANAL FISSURE: Status: ACTIVE | Noted: 2023-07-31

## 2023-08-01 NOTE — CONSULT LETTER
[Dear  ___] : Dear  [unfilled], [Consult Letter:] : I had the pleasure of evaluating your patient, [unfilled]. [Please see my note below.] : Please see my note below. [Consult Closing:] : Thank you very much for allowing me to participate in the care of this patient.  If you have any questions, please do not hesitate to contact me. [Sincerely,] : Sincerely, [FreeTextEntry2] : Dr Shraddha Christian [FreeTextEntry3] : Shantel Carrington M.D., F.A.C.S., F.A.S.C.R.S. Assistant Professor of Surgery [employer] [facility] [employer] at [facility]

## 2023-08-01 NOTE — ASSESSMENT
[FreeTextEntry1] : 44 yo female with acute fissure and large external hemorrhoid. I prescribed NTG oint to start today and advised for sitz baths and pain control with Tylenol and limited use of ibuprofen.  In order to fully treat her disease, she needs an excisional hemorrhoidectomy & Botox injection. She is going away to a cruise on 8/15. That gives us a limited time to do the surgery prior to her trip and it would be best for her to avoid traveling for 2-3 weeks postop. Optimally her pain will improve with the NTG and she'll have the procedure after her trip. She'll call my office on Thursday to report on how she is doing. If her pain persists, she may need to postpone her trip for the procedure.

## 2023-08-01 NOTE — PHYSICAL EXAM
[FreeTextEntry1] : This is a 43 year-old well-developed female in no apparent distress.  HEENT normocephalic, anicteric, external ears normal bilaterally, EOMs intact.  Cardiac - regular rate and rhythm.  Abdomen soft, nontender, nondistended, no masses. No hepatosplenomegaly.  Examination of the perineum reveals a large firm anterior external hemorrhoid with a fissure at its bed in the distal anal canal. Digital rectal examination reveals sphincter spasm and no masses.  Anoscopy not done d/t pt's severe pain.    Neuro-cranial nerves grossly intact. Normal gait.  Psychiatric-oriented to time place and person. Good understanding of conversation.

## 2023-08-01 NOTE — HISTORY OF PRESENT ILLNESS
[FreeTextEntry1] : 42 yo female with 5 days of painful perianal lump.  On Mounjaro, denies constipation. No prior anorectal issues, doesn't know if she had any excess tissue in the area prior to this episode. Denies bleeding. BMs 1/day. Denies family h/o colon Ca.

## 2023-08-20 ENCOUNTER — INPATIENT (INPATIENT)
Facility: HOSPITAL | Age: 43
LOS: 0 days | Discharge: HOME | End: 2023-08-21
Attending: HOSPITALIST | Admitting: HOSPITALIST
Payer: COMMERCIAL

## 2023-08-20 VITALS
WEIGHT: 205.91 LBS | OXYGEN SATURATION: 99 % | TEMPERATURE: 99 F | DIASTOLIC BLOOD PRESSURE: 72 MMHG | RESPIRATION RATE: 18 BRPM | HEART RATE: 110 BPM | SYSTOLIC BLOOD PRESSURE: 126 MMHG

## 2023-08-20 DIAGNOSIS — D75.89 OTHER SPECIFIED DISEASES OF BLOOD AND BLOOD-FORMING ORGANS: ICD-10-CM

## 2023-08-20 DIAGNOSIS — E11.9 TYPE 2 DIABETES MELLITUS WITHOUT COMPLICATIONS: ICD-10-CM

## 2023-08-20 DIAGNOSIS — D70.9 NEUTROPENIA, UNSPECIFIED: ICD-10-CM

## 2023-08-20 DIAGNOSIS — C92.00 ACUTE MYELOBLASTIC LEUKEMIA, NOT HAVING ACHIEVED REMISSION: ICD-10-CM

## 2023-08-20 DIAGNOSIS — J45.909 UNSPECIFIED ASTHMA, UNCOMPLICATED: ICD-10-CM

## 2023-08-20 LAB
ALBUMIN SERPL ELPH-MCNC: 3.7 G/DL — SIGNIFICANT CHANGE UP (ref 3.3–5)
ALP SERPL-CCNC: 73 U/L — SIGNIFICANT CHANGE UP (ref 40–120)
ALT FLD-CCNC: 24 U/L — SIGNIFICANT CHANGE UP (ref 4–33)
ANION GAP SERPL CALC-SCNC: 13 MMOL/L — SIGNIFICANT CHANGE UP (ref 7–14)
ANISOCYTOSIS BLD QL: SLIGHT — SIGNIFICANT CHANGE UP
APPEARANCE UR: CLEAR — SIGNIFICANT CHANGE UP
APTT BLD: 27.7 SEC — SIGNIFICANT CHANGE UP (ref 24.5–35.6)
AST SERPL-CCNC: 30 U/L — SIGNIFICANT CHANGE UP (ref 4–32)
B PERT DNA SPEC QL NAA+PROBE: SIGNIFICANT CHANGE UP
B PERT+PARAPERT DNA PNL SPEC NAA+PROBE: SIGNIFICANT CHANGE UP
BACTERIA # UR AUTO: ABNORMAL /HPF
BASE EXCESS BLDV CALC-SCNC: -0.6 MMOL/L — SIGNIFICANT CHANGE UP (ref -2–3)
BASOPHILS # BLD AUTO: 0 K/UL — SIGNIFICANT CHANGE UP (ref 0–0.2)
BASOPHILS NFR BLD AUTO: 0 % — SIGNIFICANT CHANGE UP (ref 0–2)
BILIRUB SERPL-MCNC: 0.3 MG/DL — SIGNIFICANT CHANGE UP (ref 0.2–1.2)
BILIRUB UR-MCNC: NEGATIVE — SIGNIFICANT CHANGE UP
BLASTS # FLD: 17.4 % — CRITICAL HIGH (ref 0–0)
BLOOD GAS VENOUS COMPREHENSIVE RESULT: SIGNIFICANT CHANGE UP
BORDETELLA PARAPERTUSSIS (RAPRVP): SIGNIFICANT CHANGE UP
BUN SERPL-MCNC: 9 MG/DL — SIGNIFICANT CHANGE UP (ref 7–23)
C PNEUM DNA SPEC QL NAA+PROBE: SIGNIFICANT CHANGE UP
CALCIUM SERPL-MCNC: 8.8 MG/DL — SIGNIFICANT CHANGE UP (ref 8.4–10.5)
CHLORIDE BLDV-SCNC: 105 MMOL/L — SIGNIFICANT CHANGE UP (ref 96–108)
CHLORIDE SERPL-SCNC: 100 MMOL/L — SIGNIFICANT CHANGE UP (ref 98–107)
CO2 BLDV-SCNC: 26.3 MMOL/L — HIGH (ref 22–26)
CO2 SERPL-SCNC: 21 MMOL/L — LOW (ref 22–31)
COLOR SPEC: YELLOW — SIGNIFICANT CHANGE UP
CREAT SERPL-MCNC: 0.64 MG/DL — SIGNIFICANT CHANGE UP (ref 0.5–1.3)
DACRYOCYTES BLD QL SMEAR: SLIGHT — SIGNIFICANT CHANGE UP
DIFF PNL FLD: ABNORMAL
EGFR: 114 ML/MIN/1.73M2 — SIGNIFICANT CHANGE UP
EOSINOPHIL # BLD AUTO: 0 K/UL — SIGNIFICANT CHANGE UP (ref 0–0.5)
EOSINOPHIL NFR BLD AUTO: 0 % — SIGNIFICANT CHANGE UP (ref 0–6)
FLUAV SUBTYP SPEC NAA+PROBE: SIGNIFICANT CHANGE UP
FLUBV RNA SPEC QL NAA+PROBE: SIGNIFICANT CHANGE UP
GAS PNL BLDV: 133 MMOL/L — LOW (ref 136–145)
GAS PNL BLDV: SIGNIFICANT CHANGE UP
GLUCOSE BLDV-MCNC: 83 MG/DL — SIGNIFICANT CHANGE UP (ref 70–99)
GLUCOSE SERPL-MCNC: 85 MG/DL — SIGNIFICANT CHANGE UP (ref 70–99)
GLUCOSE UR QL: NEGATIVE MG/DL — SIGNIFICANT CHANGE UP
HADV DNA SPEC QL NAA+PROBE: SIGNIFICANT CHANGE UP
HCO3 BLDV-SCNC: 25 MMOL/L — SIGNIFICANT CHANGE UP (ref 22–29)
HCOV 229E RNA SPEC QL NAA+PROBE: SIGNIFICANT CHANGE UP
HCOV HKU1 RNA SPEC QL NAA+PROBE: SIGNIFICANT CHANGE UP
HCOV NL63 RNA SPEC QL NAA+PROBE: SIGNIFICANT CHANGE UP
HCOV OC43 RNA SPEC QL NAA+PROBE: SIGNIFICANT CHANGE UP
HCT VFR BLD CALC: 22.8 % — LOW (ref 34.5–45)
HCT VFR BLDA CALC: 24 % — LOW (ref 34.5–46.5)
HGB BLD CALC-MCNC: 8.1 G/DL — LOW (ref 11.7–16.1)
HGB BLD-MCNC: 7.5 G/DL — LOW (ref 11.5–15.5)
HMPV RNA SPEC QL NAA+PROBE: SIGNIFICANT CHANGE UP
HPIV1 RNA SPEC QL NAA+PROBE: SIGNIFICANT CHANGE UP
HPIV2 RNA SPEC QL NAA+PROBE: SIGNIFICANT CHANGE UP
HPIV3 RNA SPEC QL NAA+PROBE: SIGNIFICANT CHANGE UP
HPIV4 RNA SPEC QL NAA+PROBE: SIGNIFICANT CHANGE UP
IANC: 1.15 K/UL — LOW (ref 1.8–7.4)
INR BLD: 1.24 RATIO — HIGH (ref 0.85–1.18)
KETONES UR-MCNC: NEGATIVE MG/DL — SIGNIFICANT CHANGE UP
LACTATE BLDV-MCNC: 1.2 MMOL/L — SIGNIFICANT CHANGE UP (ref 0.5–2)
LDH SERPL L TO P-CCNC: 642 U/L — HIGH (ref 135–225)
LEUKOCYTE ESTERASE UR-ACNC: NEGATIVE — SIGNIFICANT CHANGE UP
LYMPHOCYTES # BLD AUTO: 2.73 K/UL — SIGNIFICANT CHANGE UP (ref 1–3.3)
LYMPHOCYTES # BLD AUTO: 47 % — HIGH (ref 13–44)
M PNEUMO DNA SPEC QL NAA+PROBE: SIGNIFICANT CHANGE UP
MACROCYTES BLD QL: SIGNIFICANT CHANGE UP
MAGNESIUM SERPL-MCNC: 2.4 MG/DL — SIGNIFICANT CHANGE UP (ref 1.6–2.6)
MANUAL SMEAR VERIFICATION: SIGNIFICANT CHANGE UP
MCHC RBC-ENTMCNC: 31.6 PG — SIGNIFICANT CHANGE UP (ref 27–34)
MCHC RBC-ENTMCNC: 32.9 GM/DL — SIGNIFICANT CHANGE UP (ref 32–36)
MCV RBC AUTO: 96.2 FL — SIGNIFICANT CHANGE UP (ref 80–100)
MICROCYTES BLD QL: SLIGHT — SIGNIFICANT CHANGE UP
MONOCYTES # BLD AUTO: 1.31 K/UL — HIGH (ref 0–0.9)
MONOCYTES NFR BLD AUTO: 22.6 % — HIGH (ref 2–14)
NEUTROPHILS # BLD AUTO: 0.66 K/UL — LOW (ref 1.8–7.4)
NEUTROPHILS NFR BLD AUTO: 10.4 % — LOW (ref 43–77)
NEUTS BAND # BLD: 0.9 % — SIGNIFICANT CHANGE UP (ref 0–6)
NITRITE UR-MCNC: NEGATIVE — SIGNIFICANT CHANGE UP
OVALOCYTES BLD QL SMEAR: SLIGHT — SIGNIFICANT CHANGE UP
PCO2 BLDV: 44 MMHG — SIGNIFICANT CHANGE UP (ref 39–52)
PH BLDV: 7.36 — SIGNIFICANT CHANGE UP (ref 7.32–7.43)
PH UR: 6 — SIGNIFICANT CHANGE UP (ref 5–8)
PHOSPHATE SERPL-MCNC: 4 MG/DL — SIGNIFICANT CHANGE UP (ref 2.5–4.5)
PLAT MORPH BLD: NORMAL — SIGNIFICANT CHANGE UP
PLATELET # BLD AUTO: 81 K/UL — LOW (ref 150–400)
PLATELET COUNT - ESTIMATE: ABNORMAL
PO2 BLDV: 31 MMHG — SIGNIFICANT CHANGE UP (ref 25–45)
POIKILOCYTOSIS BLD QL AUTO: SLIGHT — SIGNIFICANT CHANGE UP
POTASSIUM BLDV-SCNC: 5.3 MMOL/L — HIGH (ref 3.5–5.1)
POTASSIUM SERPL-MCNC: 4.9 MMOL/L — SIGNIFICANT CHANGE UP (ref 3.5–5.3)
POTASSIUM SERPL-SCNC: 4.9 MMOL/L — SIGNIFICANT CHANGE UP (ref 3.5–5.3)
PROT SERPL-MCNC: 9.3 G/DL — HIGH (ref 6–8.3)
PROT UR-MCNC: NEGATIVE MG/DL — SIGNIFICANT CHANGE UP
PROTHROM AB SERPL-ACNC: 13.9 SEC — HIGH (ref 9.5–13)
RAPID RVP RESULT: SIGNIFICANT CHANGE UP
RBC # BLD: 2.37 M/UL — LOW (ref 3.8–5.2)
RBC # FLD: 17.2 % — HIGH (ref 10.3–14.5)
RBC BLD AUTO: NORMAL — SIGNIFICANT CHANGE UP
RBC CASTS # UR COMP ASSIST: 4 /HPF — SIGNIFICANT CHANGE UP (ref 0–4)
RSV RNA SPEC QL NAA+PROBE: SIGNIFICANT CHANGE UP
RV+EV RNA SPEC QL NAA+PROBE: SIGNIFICANT CHANGE UP
SAO2 % BLDV: 42.8 % — LOW (ref 67–88)
SARS-COV-2 RNA SPEC QL NAA+PROBE: SIGNIFICANT CHANGE UP
SCHISTOCYTES BLD QL AUTO: SLIGHT — SIGNIFICANT CHANGE UP
SODIUM SERPL-SCNC: 134 MMOL/L — LOW (ref 135–145)
SP GR SPEC: 1.01 — SIGNIFICANT CHANGE UP (ref 1–1.03)
SPHEROCYTES BLD QL SMEAR: SLIGHT — SIGNIFICANT CHANGE UP
URATE SERPL-MCNC: 4.1 MG/DL — SIGNIFICANT CHANGE UP (ref 2.5–7)
UROBILINOGEN FLD QL: 1 MG/DL — SIGNIFICANT CHANGE UP (ref 0.2–1)
VARIANT LYMPHS # BLD: 1.7 % — SIGNIFICANT CHANGE UP (ref 0–6)
WBC # BLD: 5.8 K/UL — SIGNIFICANT CHANGE UP (ref 3.8–10.5)
WBC # FLD AUTO: 5.8 K/UL — SIGNIFICANT CHANGE UP (ref 3.8–10.5)
WBC UR QL: 4 /HPF — SIGNIFICANT CHANGE UP (ref 0–5)

## 2023-08-20 PROCEDURE — 71046 X-RAY EXAM CHEST 2 VIEWS: CPT | Mod: 26

## 2023-08-20 PROCEDURE — 99285 EMERGENCY DEPT VISIT HI MDM: CPT

## 2023-08-20 PROCEDURE — 99223 1ST HOSP IP/OBS HIGH 75: CPT | Mod: GC

## 2023-08-20 RX ORDER — ESTRADIOL AND NORETHINDRONE ACETATE .5; .1 MG/1; MG/1
1 TABLET, FILM COATED ORAL
Refills: 0 | DISCHARGE

## 2023-08-20 RX ORDER — GUAR GUM
1 POWDER (GRAM) ORAL
Refills: 0 | DISCHARGE

## 2023-08-20 RX ORDER — SODIUM CHLORIDE 9 MG/ML
1000 INJECTION INTRAMUSCULAR; INTRAVENOUS; SUBCUTANEOUS ONCE
Refills: 0 | Status: COMPLETED | OUTPATIENT
Start: 2023-08-20 | End: 2023-08-20

## 2023-08-20 RX ORDER — POLYETHYLENE GLYCOL 3350 17 G/17G
17 POWDER, FOR SOLUTION ORAL ONCE
Refills: 0 | Status: COMPLETED | OUTPATIENT
Start: 2023-08-20 | End: 2023-08-21

## 2023-08-20 RX ORDER — POLYETHYLENE GLYCOL 3350 17 G/17G
0 POWDER, FOR SOLUTION ORAL
Refills: 0 | DISCHARGE

## 2023-08-20 RX ORDER — ALBUTEROL 90 UG/1
2 AEROSOL, METERED ORAL EVERY 6 HOURS
Refills: 0 | Status: DISCONTINUED | OUTPATIENT
Start: 2023-08-20 | End: 2023-08-21

## 2023-08-20 RX ORDER — TIRZEPATIDE 15 MG/.5ML
10 INJECTION, SOLUTION SUBCUTANEOUS
Refills: 0 | DISCHARGE

## 2023-08-20 RX ORDER — CEFEPIME 1 G/1
2000 INJECTION, POWDER, FOR SOLUTION INTRAMUSCULAR; INTRAVENOUS EVERY 8 HOURS
Refills: 0 | Status: DISCONTINUED | OUTPATIENT
Start: 2023-08-21 | End: 2023-08-21

## 2023-08-20 RX ORDER — PIPERACILLIN AND TAZOBACTAM 4; .5 G/20ML; G/20ML
3.38 INJECTION, POWDER, LYOPHILIZED, FOR SOLUTION INTRAVENOUS ONCE
Refills: 0 | Status: COMPLETED | OUTPATIENT
Start: 2023-08-20 | End: 2023-08-20

## 2023-08-20 RX ADMIN — PIPERACILLIN AND TAZOBACTAM 200 GRAM(S): 4; .5 INJECTION, POWDER, LYOPHILIZED, FOR SOLUTION INTRAVENOUS at 18:24

## 2023-08-20 RX ADMIN — SODIUM CHLORIDE 1000 MILLILITER(S): 9 INJECTION INTRAMUSCULAR; INTRAVENOUS; SUBCUTANEOUS at 18:24

## 2023-08-20 NOTE — ED ADULT NURSE NOTE - NSFALLUNIVINTERV_ED_ALL_ED
Bed/Stretcher in lowest position, wheels locked, appropriate side rails in place/Call bell, personal items and telephone in reach/Instruct patient to call for assistance before getting out of bed/chair/stretcher/Non-slip footwear applied when patient is off stretcher/[place] to call system/Physically safe environment - no spills, clutter or unnecessary equipment/Purposeful proactive rounding/Room/bathroom lighting operational, light cord in reach

## 2023-08-20 NOTE — ED ADULT NURSE REASSESSMENT NOTE - NS ED NURSE REASSESS COMMENT FT1
Handoff receive pt in bed A*Ox4, 20G in right a/c, no signs of infiltration, bolus in progress, call bell in reach, safety maintained.

## 2023-08-20 NOTE — H&P ADULT - PROBLEM SELECTOR PLAN 1
ANC of 660 and temperature of 100.2  -Cefipime 2g/Q8H  - F/U Blood Cx's Immunocompromised, new diagnosis of acute myeloid leukemia; Blasts 17%, ANC of 660, Fever, oral Eyok=975.2     -Cefepime IV 2g/Q8H  -Low suspicion of MRSA or anaerobic infection; Favor Cefepime over Vancomycin IV with Zosyn IV   -VS q4h  -CXR:  clear lungs  -UA not c/w UTI  -RVP negative   -F/U BCx and UCx

## 2023-08-20 NOTE — H&P ADULT - EYES
From: Yelena Gaines  To: Aidee Nicholson MD  Sent: 3/11/2019 4:31 PM CDT  Subject: Other    I need prescriptions sent to [facility] in [place]     Montelukast 10mg tablets 90 day supply for 1 year   Pravastatin 20mg tablets 90 day supply for 1 year   Lisinopril 40 mg tablets 2x a day (180) 90 day supply for 1 year   Hydrochlorothiazide 25mg tablets 90 day supply for 1 year    Thank you, Yelena Gaines  
PERRL/conjunctiva clear

## 2023-08-20 NOTE — ED PROVIDER NOTE - ATTENDING CONTRIBUTION TO CARE
43F poss new leukemia dx in [place] after a cruise, found after CBC done when pt presented due to easy bruising.  H/O looking at smear eval for blasts.  Pt has appt for [facility] tomorrow.  100.2R.  Pt got 2U of blood in [place].  Tentative plan is inpt adm to [facility] tomorrow.  Pt feeling weak, tired, so advised to come to ED today.  H/O met pt here, LG temp, mild tachycardia, concerned for occult infection especially given blasts on smear / diff and likely immunocompromised; if there is infection induction might be deferred; H/O recc IV abx and admit for observation, observe cultures.  No focal sign of infection, no rash, abd pain or dysuria or cough.  Hgb over 7 at this point, no indication for tranfusion at present.  Plt 81, adequate and no need for plt transfusion.     VS: (+)LG temp, tachycardia    GEN - NAD;  malaise, pallor;   A+O x3   HEAD - NC/AT     ENT - PEERL, EOMI, mucous membranes  dry , no discharge    No epistaxis or gum bleeding  NECK: Neck supple, non-tender without lymphadenopathy, no masses, no JVD  PULM - CTA b/l,  symmetric breath sounds  COR -  normal heart sounds    ABD - , ND, NT, soft,  BACK - no CVA tenderness, nontender spine     EXTREMS - no edema, no deformity, warm and well perfused    SKIN - no rash   (+)scattered bruising      NEUROLOGIC - alert, face symmetric, speech fluent, sensation nl, motor no focal deficit.

## 2023-08-20 NOTE — H&P ADULT - NSHPPHYSICALEXAM_GEN_ALL_CORE
Vital Signs Last 24 Hrs  T(C): 36.7 (20 Aug 2023 20:34), Max: 37.9 (20 Aug 2023 16:00)  T(F): 98 (20 Aug 2023 20:34), Max: 100.2 (20 Aug 2023 16:00)  HR: 82 (20 Aug 2023 20:34) (82 - 110)  BP: 130/80 (20 Aug 2023 20:34) (126/72 - 130/80)  BP(mean): --  RR: 16 (20 Aug 2023 20:34) (16 - 18)  SpO2: 100% (20 Aug 2023 20:34) (99% - 100%)    Parameters below as of 20 Aug 2023 20:34  Patient On (Oxygen Delivery Method): room air T(C): 36.8 (08-20-23 @ 23:37), Max: 37.9 (08-20-23 @ 16:00)  HR: 78 (08-20-23 @ 23:37) (78 - 110)  BP: 131/81 (08-20-23 @ 23:37) (126/72 - 131/81)  RR: 16 (08-20-23 @ 23:37) (16 - 18)  SpO2: 100% (08-20-23 @ 23:37) (99% - 100%)    CONSTITUTIONAL: Well groomed, no apparent distress  EYES: PERRLA and symmetric, EOMI  ENMT: Oral mucosa with moist membranes. Normal dentition; no pharyngeal injection or exudate  RESP: No respiratory distress, no use of accessory muscles; CTA b/l, no WRR  CV: RRR, +S1S2, no MRG; no JVD; no peripheral edema  GI: Soft, NT, ND, no rebound, no guarding  MSK:Normal ROM without pain, no spinal tenderness, normal muscle strength/tone  SKIN: No rashes or ulcers noted; no subcutaneous nodules or induration palpable  NEURO: CN II-XII intact; normal reflexes in upper and lower extremities, sensation intact in upper and lower extremities b/l to light touch   PSYCH: Appropriate insight/judgment; A+O x 3, mood and affect appropriate, recent/remote memory intact T(C): 36.8 (08-20-23 @ 23:37), Max: 37.9 (08-20-23 @ 16:00)  HR: 78 (08-20-23 @ 23:37) (78 - 110)  BP: 131/81 (08-20-23 @ 23:37) (126/72 - 131/81)  RR: 16 (08-20-23 @ 23:37) (16 - 18)  SpO2: 100% (08-20-23 @ 23:37) (99% - 100%)    CONSTITUTIONAL: Well groomed, no apparent distress  EYES: PERRLA and symmetric, EOMI  ENMT: Oral mucosa with moist membranes. Normal dentition; no pharyngeal injection or exudate  RESP: No respiratory distress, no use of accessory muscles; CTA b/l, no WRR  CV: RRR, +S1S2, no MRG; no JVD; no peripheral edema  GI: Soft, NT, ND, no rebound, no guarding  MSK:Normal ROM without pain, no spinal tenderness, normal muscle strength/tone. No assymetry or erthyma.  SKIN: No rashes or ulcers noted;   NEURO: CN II-XII intact; normal reflexes in upper and lower extremities, sensation intact in upper and lower extremities b/l to light touch   PSYCH: Appropriate insight/judgment; A+O x 3, mood and affect appropriate, recent/remote memory intact T(C): 36.8 (08-20-23 @ 23:37), Max: 37.9 (08-20-23 @ 16:00)  HR: 78 (08-20-23 @ 23:37) (78 - 110)  BP: 131/81 (08-20-23 @ 23:37) (126/72 - 131/81)  RR: 16 (08-20-23 @ 23:37) (16 - 18)  SpO2: 100% (08-20-23 @ 23:37) (99% - 100%)    CONSTITUTIONAL: Well groomed, no apparent distress  EYES: PERRLA and symmetric, EOMI  ENMT: Oral mucosa with moist membranes. Normal dentition; no pharyngeal injection or exudate  RESP: No respiratory distress, no use of accessory muscles; CTA b/l, no WRR  CV: RRR, +S1S2, no MRG; no JVD; no peripheral edema  GI: Soft, NT, ND, no rebound, no guarding  MSK:Normal ROM without pain, no spinal tenderness, normal muscle strength/tone. No asymmetry or erythema.  SKIN: No rashes or ulcers noted;   NEURO: CN II-XII intact; normal reflexes in upper and lower extremities, sensation intact in upper and lower extremities b/l to light touch   PSYCH: Appropriate insight/judgment; A+O x 3, mood and affect appropriate, recent/remote memory intact

## 2023-08-20 NOTE — H&P ADULT - NSICDXPASTSURGICALHX_GEN_ALL_CORE_FT
Concern for cardiopulmonary deterioration
PAST SURGICAL HISTORY:  No significant past surgical history

## 2023-08-20 NOTE — H&P ADULT - NSHPREVIEWOFSYSTEMS_GEN_ALL_CORE
REVIEW OF SYSTEMS:    CONSTITUTIONAL:  No weakness or  chills. +Fevers   EYES/ENT:  No visual changes;    NECK:  No pain or stiffness  RESPIRATORY:  No cough, wheezing, hemoptysis; No shortness of breath  CARDIOVASCULAR:  No chest pain or palpitations  GASTROINTESTINAL:  No abdominal or epigastric pain. No nausea, vomiting, or hematemesis; No diarrhea or constipation. No melena or hematochezia.  GENITOURINARY:  No dysuria, frequency or hematuria  MUSCULOSKELETAL:  FROM all extremities, normal strength  NEUROLOGICAL:  No numbness or weakness  SKIN:  No itching, rashes

## 2023-08-20 NOTE — ED PROVIDER NOTE - CLINICAL SUMMARY MEDICAL DECISION MAKING FREE TEXT BOX
44 y/o female w/ PMH recently diagnosed acute leukemia c/o 1 month history of fatigue. Otherwise asymptomatic. Denies fevers, chills, nausea, vomiting, dizziness, chest pain, SOB, abdominal pain, dysuria, hematuria. Sinus tachy w/ low grade fever. Concerning for sepsis w/ immunocompromised state. Will draw sepsis labs, fluids, abxs, and likely admit for IV abxs w/ oncology consult.    Oncology: Dr Colon

## 2023-08-20 NOTE — H&P ADULT - PROBLEM SELECTOR PLAN 2
-Heme-onc following, recs appreciated  -F/U fibrinogen and coags  -HgB <7 transfuse   -Flow Cytometry and PML-MCKENNA FISH ordered New diagnosis; Blasts 17%, , Thrombocytopenia= 81K    -Formally evaluated by Heme-onc following, reviewed recs appreciated  -F/U fibrinogen and coags  -HgB <7 transfuse, leuko reduced  irradiated  blood products per Heme/Onc recs  -Flow Cytometry and PML-MCKENNA FISH ordered  - Monitor for bleeding  -Monitor uric acid, phos, potassium

## 2023-08-20 NOTE — CONSULT NOTE ADULT - ASSESSMENT
43 year old female with no PMH presented to a hospital in [place] with symptomatic anemia x 3 weeks found to have a hgb 5.5, hct 17, blood smear c/f acute leukemia. She received pRBC transfusion x 2 at the facility. Now presenting to [facility] for further evaluation.     C/f acute leukemia  - Blood smear from [place] w/ concern for acute leukemia + schistocytes  - Follow up on urgent labs : flow cytometry, fibrinogen, coags, PML/MCKENNA, uric acid, blood smear, LDH, CBC, CMP  - Transfuse for hgb < 7.0, plt < 10k    If pt remains stable w/ normal coags can consider d/c for follow up at [facility].    INCOMPLETE NOTE 43 year old female with DM2, asthma, allergies presented to a hospital in [place] with symptomatic anemia x 3 weeks found to have a hgb 5.5, hct 17, blood smear c/f acute leukemia. She received pRBC transfusion x 2 at the facility. Now presenting to [facility] for further evaluation.     C/f acute leukemia  - Blood smear from [place] w/ concern for acute leukemia + schistocytes. Coags were normal, initial hgb 5.5.  - Follow up on urgent labs : flow cytometry, fibrinogen, coags, PML/MCKENNA, uric acid, blood smear, LDH, CBC, CMP  - Transfuse for hgb < 7.0, plt < 10k    If pt remains stable w/ normal coags can consider d/c for follow up at [facility].    Uche Vail PA-C  Hematology/Oncology  [facility]  [phone] (office)

## 2023-08-20 NOTE — H&P ADULT - PROBLEM SELECTOR PLAN 3
-HgB of 7.5 and PLT 81 likely due to AML   -Heme-onc following, recs appreciated   -s/p 2U pRBC on 8/18 -Hgb of 7.5 and PLT 81K likely due to AML   -Heme-onc following, recs appreciated   -s/p 2U pRBC on 8/18    -Monitor Plt count and Hgb daily   -Monitor hemolysis labs

## 2023-08-20 NOTE — H&P ADULT - NSHPLABSRESULTS_GEN_ALL_CORE
.    -Personally interpreted EKG: pending     -Personally interpreted CXR: clear lungs, no PTX, no pleural effusions     -Personally reviewed the following labs below:                        7.5    5.80  )-----------( 81       ( 20 Aug 2023 16:00 )             22.8   08-20    134<L>  |  100  |  9   ----------------------------<  85  4.9   |  21<L>  |  0.64    Ca    8.8      20 Aug 2023 16:41  Phos  4.0     08-20  Mg     2.40     08-20    TPro  9.3<H>  /  Alb  3.7  /  TBili  0.3  /  DBili  x   /  AST  30  /  ALT  24  /  AlkPhos  73  08-20    16:10 - VBG - pH: 7.36  | pCO2: 44    | pO2: 31    | Lactate: 1.2    Urinalysis Basic - ( 20 Aug 2023 16:41 )    Color: x / Appearance: x / SG: x / pH: x  Gluc: 85 mg/dL / Ketone: x  / Bili: x / Urobili: x   Blood: x / Protein: x / Nitrite: x   Leuk Esterase: x / RBC: x / WBC x   Sq Epi: x / Non Sq Epi: x / Bacteria: x    Adenovirus (RapRVP): NotDetec (08-20-23 @ 16:38)  NL63 Coronavirus (RapRVP): NotDetec (08-20-23 @ 16:38)  OC43 Coronavirus (RapRVP): NotDetec (08-20-23 @ 16:38)  hMPV (RapRVP): NotDetec (08-20-23 @ 16:38)  Entero/Rhinovirus (RapRVP): NotDetec (08-20-23 @ 16:38)  Influenza A (RapRVP): NotDetec (08-20-23 @ 16:38)  Influenza B (RapRVP): NotDetec (08-20-23 @ 16:38)  Parainfluenza 1 (RapRVP): NotDetec (08-20-23 @ 16:38)  Parainfluenza 2 (RapRVP): NotDetec (08-20-23 @ 16:38)  Parainfluenza 3 (RapRVP): NotDetec (08-20-23 @ 16:38)  Parainfluenza 4 (RapRVP): NotDetec (08-20-23 @ 16:38)  Resp Syncytial Virus (RapRVP): NotDetec (08-20-23 @ 16:38)  Bordetella parapertussis (RapRVP): NotDetec (08-20-23 @ 16:38)  Mycoplasma pneumoniae (RapRVP): NotDetec (08-20-23 @ 16:38) .    -Personally interpreted EKG: NSR, 84bpm, QTc, no acute Tw or ST changes, no PACs or PVCs      -Personally interpreted CXR: clear lungs, no PTX, no pleural effusions     -Personally reviewed the following labs below:                        7.5    5.80  )-----------( 81       ( 20 Aug 2023 16:00 )             22.8   08-20    134<L>  |  100  |  9   ----------------------------<  85  4.9   |  21<L>  |  0.64    Ca    8.8      20 Aug 2023 16:41  Phos  4.0     08-20  Mg     2.40     08-20    TPro  9.3<H>  /  Alb  3.7  /  TBili  0.3  /  DBili  x   /  AST  30  /  ALT  24  /  AlkPhos  73  08-20    16:10 - VBG - pH: 7.36  | pCO2: 44    | pO2: 31    | Lactate: 1.2    Urinalysis Basic - ( 20 Aug 2023 16:41 )    Color: x / Appearance: x / SG: x / pH: x  Gluc: 85 mg/dL / Ketone: x  / Bili: x / Urobili: x   Blood: x / Protein: x / Nitrite: x   Leuk Esterase: x / RBC: x / WBC x   Sq Epi: x / Non Sq Epi: x / Bacteria: x    Adenovirus (RapRVP): NotDetec (08-20-23 @ 16:38)  NL63 Coronavirus (RapRVP): NotDetec (08-20-23 @ 16:38)  OC43 Coronavirus (RapRVP): NotDetec (08-20-23 @ 16:38)  hMPV (RapRVP): NotDetec (08-20-23 @ 16:38)  Entero/Rhinovirus (RapRVP): NotDetec (08-20-23 @ 16:38)  Influenza A (RapRVP): NotDetec (08-20-23 @ 16:38)  Influenza B (RapRVP): NotDetec (08-20-23 @ 16:38)  Parainfluenza 1 (RapRVP): NotDetec (08-20-23 @ 16:38)  Parainfluenza 2 (RapRVP): NotDetec (08-20-23 @ 16:38)  Parainfluenza 3 (RapRVP): NotDetec (08-20-23 @ 16:38)  Parainfluenza 4 (RapRVP): NotDetec (08-20-23 @ 16:38)  Resp Syncytial Virus (RapRVP): NotDetec (08-20-23 @ 16:38)  Bordetella parapertussis (RapRVP): NotDetec (08-20-23 @ 16:38)  Mycoplasma pneumoniae (RapRVP): NotDetec (08-20-23 @ 16:38)

## 2023-08-20 NOTE — H&P ADULT - MUSCULOSKELETAL
no joint swelling/no joint erythema/no joint warmth/no calf tenderness/strength 5/5 bilateral upper extremities/strength 5/5 bilateral lower extremities

## 2023-08-20 NOTE — CONSULT NOTE ADULT - SUBJECTIVE AND OBJECTIVE BOX
Reason for consult: Concern for leukemia    HPI: 43 year old female with no PMH presented to a hospital in [place] with symptomatic anemia x 3 weeks found to have a hgb 5.5, hct 17, blood smear c/f acute leukemia. She received pRBC transfusion x 2 at the facility. Now presenting to [facility] for further evaluation. Urgent labs ordered: flow cytometry, fibrinogen, coags, PML/MCKENNA, uric acid, LDH, CBC, CMP. Presenting w/ BARNEY, lightheadedness.    PAST MEDICAL & SURGICAL HISTORY:  No pertinent past medical history      No significant past surgical history          FAMILY HISTORY:      Alochol: Denied  Smoking: Nonsmoker  Drug Use: Denied  Marital Status:         Allergies    Raisins (Anaphylaxis)  walnut (Anaphylaxis)  strawberry (Anaphylaxis)  contrast media (gadolinium-based) (Hives)    Intolerances        MEDICATIONS  (STANDING):    MEDICATIONS  (PRN):      ROS  No fever, sweats, chills  No epistaxis, HA, sore throat  No CP, SOB, cough, sputum  No n/v/d, abd pain, melena, hematochezia  No edema  No rash  No anxiety  No back pain, joint pain  No bleeding, bruising  No dysuria, hematuria    T(C): 37.3 (08-20-23 @ 14:17), Max: 37.3 (08-20-23 @ 14:17)  HR: 110 (08-20-23 @ 14:17) (110 - 110)  BP: 126/72 (08-20-23 @ 14:17) (126/72 - 126/72)  RR: 18 (08-20-23 @ 14:17) (18 - 18)  SpO2: 99% (08-20-23 @ 14:17) (99% - 99%)  Wt(kg): --    PE  NAD  Awake, alert  Anicteric, MMM  RRR  CTAB  Abd soft, NT, ND  No c/c/e  No rash grossly  FROM             Reason for consult: Concern for leukemia    HPI: 43 year old female with DM2, asthma, allergies presented to a hospital in [place] with symptomatic anemia x 3 weeks found to have a hgb 5.5, hct 17, blood smear c/f acute leukemia. She received pRBC transfusion x 2 at the facility. Now presenting to [facility] for further evaluation. Urgent labs ordered: flow cytometry, fibrinogen, coags, PML/MCKENNA, uric acid, LDH, CBC, CMP. Presenting w/ BARNEY, lightheadedness.    PAST MEDICAL & SURGICAL HISTORY:  No pertinent past medical history      No significant past surgical history          FAMILY HISTORY:      Alochol: Denied  Smoking: Nonsmoker  Drug Use: Denied  Marital Status:         Allergies    Raisins (Anaphylaxis)  walnut (Anaphylaxis)  strawberry (Anaphylaxis)  contrast media (gadolinium-based) (Hives)    Intolerances        MEDICATIONS  (STANDING):    MEDICATIONS  (PRN):      ROS  No fever, sweats, chills  No epistaxis, HA, sore throat  No CP, SOB, cough, sputum  No n/v/d, abd pain, melena, hematochezia  No edema  No rash  No anxiety  No back pain, joint pain  No bleeding, bruising  No dysuria, hematuria    T(C): 37.3 (08-20-23 @ 14:17), Max: 37.3 (08-20-23 @ 14:17)  HR: 110 (08-20-23 @ 14:17) (110 - 110)  BP: 126/72 (08-20-23 @ 14:17) (126/72 - 126/72)  RR: 18 (08-20-23 @ 14:17) (18 - 18)  SpO2: 99% (08-20-23 @ 14:17) (99% - 99%)  Wt(kg): --    PE  NAD  Awake, alert  Anicteric, MMM  RRR  CTAB  Abd soft, NT, ND  No c/c/e  No rash grossly  FROM

## 2023-08-20 NOTE — ED ADULT NURSE NOTE - CHIEF COMPLAINT QUOTE
Pt AOX4 sent from an ER in [place]: was seen before boarding a cruise due to SOB and lightheadedness found to have Hg <6 and Hct < 19 and thrombocytopenia, pt received 2 units PRBCs, was advised she had indication for leukemia work-up and pt wished to return to [facility] for eval as she lives here; Arrived from [place] last night, today pt reports feeling unwell, lightheaded, BARNEY  Hx of DM type2 and eczema; bgl 94 in triage

## 2023-08-20 NOTE — H&P ADULT - ATTENDING COMMENTS
pending 43 year old female with MHx of Type 2 DM, Asthma, Eczema, premature menopause, and MARIANNE (on CPAP)  a/w neutropenic fever and bicytopenia with 17% blasts; Evaluated by Hematology/Oncology, new acute myeloid leukemia;     Assessment and plan modified and supplemented where indicated

## 2023-08-20 NOTE — ED ADULT TRIAGE NOTE - CHIEF COMPLAINT QUOTE
Pt AOX4 sent from an ER in [place]: was seen before boarding a cruise due to SOB and lightheadedness found to have Hg <6 and Hct < 19; pt received 2 units PRBCs, was advised she had indication for leukemia and pt wished to return to [facility] for eval as she lives here; Arrived from [place] last night, today pt reports feeling unwell, lightheaded, BARNEY  Hx of DM typ2 and eczema; bgl 94 in triage Pt AOX4 sent from an ER in [place]: was seen before boarding a cruise due to SOB and lightheadedness found to have Hg <6 and Hct < 19 and thrombocytopenia, pt received 2 units PRBCs, was advised she had indication for leukemia work-up and pt wished to return to [facility] for eval as she lives here; Arrived from [place] last night, today pt reports feeling unwell, lightheaded, BARNEY  Hx of DM typ2 and eczema; bgl 94 in triage Pt AOX4 sent from an ER in [place]: was seen before boarding a cruise due to SOB and lightheadedness found to have Hg <6 and Hct < 19 and thrombocytopenia, pt received 2 units PRBCs, was advised she had indication for leukemia work-up and pt wished to return to [facility] for eval as she lives here; Arrived from [place] last night, today pt reports feeling unwell, lightheaded, BARNEY  Hx of DM type2 and eczema; bgl 94 in triage

## 2023-08-20 NOTE — H&P ADULT - HISTORY OF PRESENT ILLNESS
Mrs. Hazel Wright is a 43 year old female with a PMH of T2DM, Asthma, Eczema, premature menopause,  and MARIANNE who presents to the hospital for follow-up of abnormal lab findings. Per patient they went to the ED for Hemoroids on 8/14 and were sent to the OB Clinic for a groin mass per the patient the doctor there ordered a CBC and the patient was informed their HgB was very low and they went to the ED for this on 8/18 since the patient was in [place] at the time they were told to persue follow up care when they returned to [place].     In the ED the patient's VS were stable but they did have a temperature of 99.6. The patient had an RVP, U/A, and CXR ordered all of which were normal. On CBC patient had HgB of 7.5, Plt 81, and ANC was 660. The patient was also evaluated by Heme-Onc who determined the patient has AML and recommended treating the patient for Neutropenic fever. In the ED the patient received Zosyn and 1L NS.   43 year old female with MHx of T2DM, Asthma, Eczema, premature menopause,  and MARIANNE who presents to the hospital for follow-up of abnormal lab findings. Per patient they went to the ED for Hemoroids on 8/14 and were sent to the OB Clinic for a groin mass per the patient the doctor there ordered a CBC and the patient was informed their HgB was very low and they went to the ED for this on 8/18 since the patient was in [place] at the time they were told to pursue follow up care when they returned to [place].     In the ED the patient's VS were stable but they did have a temperature of 99.6. The patient had an RVP, U/A, and CXR ordered all of which were normal. On CBC patient had HgB of 7.5, Plt 81, and ANC was 660. The patient was also evaluated by Heme-Onc who determined the patient has AML and recommended treating the patient for Neutropenic fever. In the ED the patient received Zosyn and 1L NS.   43 year old female with MHx of T2DM, Asthma, Eczema, premature menopause,  and MARIANNE who presents to the hospital for follow-up of abnormal lab findings. Per patient they went to the ED for Hemoroids on 8/14 and were sent to the OB Clinic for a groin mass per the patient the doctor there ordered a CBC and the patient was informed their HgB was very low and they went to the ED for this on 8/18 since the patient was in [place] at the time they were told to pursue follow up care when they returned to [place].     In the ED the patient's VS were stable but they did have a temperature of 100.4. The patient had an RVP, U/A, and CXR ordered all of which were normal. On CBC patient had HgB of 7.5, Plt 81, and ANC was 660. The patient was also evaluated by Heme-Onc who determined the patient has AML and recommended treating the patient for Neutropenic fever. In the ED the patient received Zosyn and 1L NS.  43 year old female with MHx of Type 2 DM, Asthma, Eczema, premature menopause, and MARIANNE (on CPAP) who presents to the hospital for follow-up of abnormal lab findings. The pt was evaluated in a hospital in [place] due to generalized weakness and feeling tired all the time. Was found to have  severe anemia, hgb 5.5, hct 17, blood smear was concerning for possible acute leukemia. Pt received 2u transfusion of pRBC at the facility. Now presenting for further evaluation to [facility] ED. Reports no SOB, CP, diarrhea, fever, cough, sore throat, abd pain.    ED course: Evaluated by Heme-Onc; Received Zosyn IV and 1L NS.

## 2023-08-20 NOTE — H&P ADULT - NS ATTEST RISK PROBLEM GEN_ALL_CORE FT
Neutropenic fever and bicytopenia with 17% blasts;  New diagnosis of acute myeloid leukemia;  Risk for bleeding

## 2023-08-20 NOTE — ED PROVIDER NOTE - OBJECTIVE STATEMENT
44 y/o female w/ PMH recently diagnosed acute leukemia c/o     weak and tired, seen in [place] yd, Hgb 5 plt 72 with blasts, have her seen at [facility] tomorrow. to be stablized and transfused prn, possible dc for outpatient work up if able. Dr Colon (oncology) is aware and to see her today in ED. 42 y/o female w/ PMH recently diagnosed acute leukemia c/o 1 month history of fatigue. Otherwise asymptomatic. Denies fevers, chills, nausea, vomiting, dizziness, chest pain, SOB, abdominal pain, dysuria, hematuria.     Oncology: Dr Colon

## 2023-08-20 NOTE — CONSULT NOTE ADULT - NS ATTEND AMEND GEN_ALL_CORE FT
Patient seen and examined with PA.  New diagnosis of acute myeloid leukemia.  Peripheral smear reviewed.  No renee rods.  17% blasts.  -  Reviewed records from outside hospital.  Status post 2 units of blood at outside hospital.  Normal coags.  -  Follow-up pending fibrinogen recommend cryo if fibrinogen less than 200.  Follow-up on coags as well.  -   Patient with neutropenic fever.  Agree with admission. Recommend broad-spectrum antibiotics vancomycin and Zosyn.  Follow cultures.  -   Please transfuse to keep hemoglobin  greater than 7.  Would need leuko reduced  irradiated  Blood products  - requested flow cytometry.  Please follow-up.  Also requested PML-MCKENNA FISH  please ensure these are in process tomorrow and follow-up.  -   Anticipated further evaluation and treatment for acute leukemia at [facility].  Will touch base with [facility] tomorrow based on how she is clinically doing to determine whether she will get outpatient workup verses hospital transfer  -  Neutropenic precautions.  -   Monitor for bleeding.    Ki Roa D.O  Hematology Oncology   [facility]  [phone] ( Office)   Evenings and weekends please call MD on call or office

## 2023-08-20 NOTE — H&P ADULT - PROBLEM SELECTOR PLAN 6
-Hold Heparin for DVT ppx due to new dx of AML, thrombocytopenia   -SCDs  -Monitor Plt count and for S&S of bleeding

## 2023-08-20 NOTE — H&P ADULT - NSICDXPASTMEDICALHX_GEN_ALL_CORE_FT
PAST MEDICAL HISTORY:  Asthma     MARIANNE (obstructive sleep apnea)      PAST MEDICAL HISTORY:  Asthma     Eczema     MARIANNE (obstructive sleep apnea)     Premature menopause

## 2023-08-20 NOTE — H&P ADULT - NSHPSOCIALHISTORY_GEN_ALL_CORE
Does not drink or smoke. Lives with  and Kids.   Lives with spouse  Ambulatory without assistive devices

## 2023-08-20 NOTE — ED PROVIDER NOTE - NSICDXPASTMEDICALHX_GEN_ALL_CORE_FT
PAST MEDICAL HISTORY:  Asthma     Eczema     MARIANNE (obstructive sleep apnea)     Premature menopause

## 2023-08-20 NOTE — ED ADULT NURSE NOTE - OBJECTIVE STATEMENT
pt advised to come to ED for low hemoglobin work up/ leukemia evaluation. pt is aaox3, speaking full clear sentences while on stretcher, pt endorses increased SOB/ BARNEY, recently has blood transfusion 2 days ago. pt ST on CM, denies chest pain, ha, dizziness, lightheadedness at this time. mild fever noted, denies previous fevers. #20 g piv place in left ac, labs sent.  at bedside, will ctm

## 2023-08-21 ENCOUNTER — TRANSCRIPTION ENCOUNTER (OUTPATIENT)
Age: 43
End: 2023-08-21

## 2023-08-21 VITALS
SYSTOLIC BLOOD PRESSURE: 123 MMHG | OXYGEN SATURATION: 100 % | HEART RATE: 82 BPM | TEMPERATURE: 98 F | DIASTOLIC BLOOD PRESSURE: 90 MMHG | RESPIRATION RATE: 17 BRPM

## 2023-08-21 DIAGNOSIS — Z29.9 ENCOUNTER FOR PROPHYLACTIC MEASURES, UNSPECIFIED: ICD-10-CM

## 2023-08-21 LAB
A1C WITH ESTIMATED AVERAGE GLUCOSE RESULT: 5.8 % — HIGH (ref 4–5.6)
ANION GAP SERPL CALC-SCNC: 11 MMOL/L — SIGNIFICANT CHANGE UP (ref 7–14)
APTT BLD: 26.6 SEC — SIGNIFICANT CHANGE UP (ref 24.5–35.6)
BASOPHILS # BLD AUTO: 0.01 K/UL — SIGNIFICANT CHANGE UP (ref 0–0.2)
BASOPHILS NFR BLD AUTO: 0.2 % — SIGNIFICANT CHANGE UP (ref 0–2)
BLD GP AB SCN SERPL QL: NEGATIVE — SIGNIFICANT CHANGE UP
BUN SERPL-MCNC: 10 MG/DL — SIGNIFICANT CHANGE UP (ref 7–23)
CALCIUM SERPL-MCNC: 8.4 MG/DL — SIGNIFICANT CHANGE UP (ref 8.4–10.5)
CHLORIDE SERPL-SCNC: 102 MMOL/L — SIGNIFICANT CHANGE UP (ref 98–107)
CO2 SERPL-SCNC: 23 MMOL/L — SIGNIFICANT CHANGE UP (ref 22–31)
CREAT SERPL-MCNC: 0.71 MG/DL — SIGNIFICANT CHANGE UP (ref 0.5–1.3)
CULTURE RESULTS: SIGNIFICANT CHANGE UP
EGFR: 109 ML/MIN/1.73M2 — SIGNIFICANT CHANGE UP
EOSINOPHIL # BLD AUTO: 0.01 K/UL — SIGNIFICANT CHANGE UP (ref 0–0.5)
EOSINOPHIL NFR BLD AUTO: 0.2 % — SIGNIFICANT CHANGE UP (ref 0–6)
ESTIMATED AVERAGE GLUCOSE: 120 — SIGNIFICANT CHANGE UP
FIBRINOGEN PPP-MCNC: 443 MG/DL — SIGNIFICANT CHANGE UP (ref 200–465)
GLUCOSE SERPL-MCNC: 88 MG/DL — SIGNIFICANT CHANGE UP (ref 70–99)
HCT VFR BLD CALC: 20.8 % — CRITICAL LOW (ref 34.5–45)
HCT VFR BLD CALC: 24.4 % — LOW (ref 34.5–45)
HGB BLD-MCNC: 6.9 G/DL — CRITICAL LOW (ref 11.5–15.5)
HGB BLD-MCNC: 8.3 G/DL — LOW (ref 11.5–15.5)
IANC: 0.85 K/UL — LOW (ref 1.8–7.4)
IMM GRANULOCYTES NFR BLD AUTO: 1.2 % — HIGH (ref 0–0.9)
INR BLD: 1.29 RATIO — HIGH (ref 0.85–1.18)
LYMPHOCYTES # BLD AUTO: 2.98 K/UL — SIGNIFICANT CHANGE UP (ref 1–3.3)
LYMPHOCYTES # BLD AUTO: 59 % — HIGH (ref 13–44)
MAGNESIUM SERPL-MCNC: 2.3 MG/DL — SIGNIFICANT CHANGE UP (ref 1.6–2.6)
MANUAL DIF COMMENT BLD-IMP: SIGNIFICANT CHANGE UP
MCHC RBC-ENTMCNC: 31.3 PG — SIGNIFICANT CHANGE UP (ref 27–34)
MCHC RBC-ENTMCNC: 31.7 PG — SIGNIFICANT CHANGE UP (ref 27–34)
MCHC RBC-ENTMCNC: 33.2 GM/DL — SIGNIFICANT CHANGE UP (ref 32–36)
MCHC RBC-ENTMCNC: 34 GM/DL — SIGNIFICANT CHANGE UP (ref 32–36)
MCV RBC AUTO: 92.1 FL — SIGNIFICANT CHANGE UP (ref 80–100)
MCV RBC AUTO: 95.4 FL — SIGNIFICANT CHANGE UP (ref 80–100)
MONOCYTES # BLD AUTO: 1.14 K/UL — HIGH (ref 0–0.9)
MONOCYTES NFR BLD AUTO: 22.6 % — HIGH (ref 2–14)
NEUTROPHILS # BLD AUTO: 0.85 K/UL — LOW (ref 1.8–7.4)
NEUTROPHILS NFR BLD AUTO: 16.8 % — LOW (ref 43–77)
NRBC # BLD: 0 /100 WBCS — SIGNIFICANT CHANGE UP (ref 0–0)
NRBC # BLD: 0 /100 WBCS — SIGNIFICANT CHANGE UP (ref 0–0)
NRBC # FLD: 0.04 K/UL — HIGH (ref 0–0)
NRBC # FLD: 0.05 K/UL — HIGH (ref 0–0)
PHOSPHATE SERPL-MCNC: 4.9 MG/DL — HIGH (ref 2.5–4.5)
PLATELET # BLD AUTO: 68 K/UL — LOW (ref 150–400)
PLATELET # BLD AUTO: 84 K/UL — LOW (ref 150–400)
POTASSIUM SERPL-MCNC: 4 MMOL/L — SIGNIFICANT CHANGE UP (ref 3.5–5.3)
POTASSIUM SERPL-SCNC: 4 MMOL/L — SIGNIFICANT CHANGE UP (ref 3.5–5.3)
PROTHROM AB SERPL-ACNC: 14.3 SEC — HIGH (ref 9.5–13)
RBC # BLD: 2.18 M/UL — LOW (ref 3.8–5.2)
RBC # BLD: 2.65 M/UL — LOW (ref 3.8–5.2)
RBC # FLD: 16.6 % — HIGH (ref 10.3–14.5)
RBC # FLD: 16.9 % — HIGH (ref 10.3–14.5)
RH IG SCN BLD-IMP: POSITIVE — SIGNIFICANT CHANGE UP
SARS-COV-2 RNA SPEC QL NAA+PROBE: SIGNIFICANT CHANGE UP
SODIUM SERPL-SCNC: 136 MMOL/L — SIGNIFICANT CHANGE UP (ref 135–145)
SPECIMEN SOURCE: SIGNIFICANT CHANGE UP
WBC # BLD: 5.05 K/UL — SIGNIFICANT CHANGE UP (ref 3.8–10.5)
WBC # BLD: 6.13 K/UL — SIGNIFICANT CHANGE UP (ref 3.8–10.5)
WBC # FLD AUTO: 5.05 K/UL — SIGNIFICANT CHANGE UP (ref 3.8–10.5)
WBC # FLD AUTO: 6.13 K/UL — SIGNIFICANT CHANGE UP (ref 3.8–10.5)

## 2023-08-21 PROCEDURE — 99239 HOSP IP/OBS DSCHRG MGMT >30: CPT | Mod: GC

## 2023-08-21 RX ORDER — DEXTROSE 50 % IN WATER 50 %
12.5 SYRINGE (ML) INTRAVENOUS ONCE
Refills: 0 | Status: DISCONTINUED | OUTPATIENT
Start: 2023-08-21 | End: 2023-08-21

## 2023-08-21 RX ORDER — DEXTROSE 50 % IN WATER 50 %
25 SYRINGE (ML) INTRAVENOUS ONCE
Refills: 0 | Status: DISCONTINUED | OUTPATIENT
Start: 2023-08-21 | End: 2023-08-21

## 2023-08-21 RX ORDER — SODIUM CHLORIDE 9 MG/ML
1000 INJECTION, SOLUTION INTRAVENOUS
Refills: 0 | Status: DISCONTINUED | OUTPATIENT
Start: 2023-08-21 | End: 2023-08-21

## 2023-08-21 RX ORDER — CHLORHEXIDINE GLUCONATE 213 G/1000ML
15 SOLUTION TOPICAL EVERY 12 HOURS
Refills: 0 | Status: DISCONTINUED | OUTPATIENT
Start: 2023-08-21 | End: 2023-08-21

## 2023-08-21 RX ORDER — ACETAMINOPHEN 500 MG
975 TABLET ORAL ONCE
Refills: 0 | Status: COMPLETED | OUTPATIENT
Start: 2023-08-21 | End: 2023-08-21

## 2023-08-21 RX ORDER — ACETAMINOPHEN 500 MG
1000 TABLET ORAL ONCE
Refills: 0 | Status: COMPLETED | OUTPATIENT
Start: 2023-08-21 | End: 2023-08-21

## 2023-08-21 RX ORDER — GLUCAGON INJECTION, SOLUTION 0.5 MG/.1ML
1 INJECTION, SOLUTION SUBCUTANEOUS ONCE
Refills: 0 | Status: DISCONTINUED | OUTPATIENT
Start: 2023-08-21 | End: 2023-08-21

## 2023-08-21 RX ORDER — INSULIN LISPRO 100/ML
VIAL (ML) SUBCUTANEOUS
Refills: 0 | Status: DISCONTINUED | OUTPATIENT
Start: 2023-08-21 | End: 2023-08-21

## 2023-08-21 RX ORDER — ALBUTEROL 90 UG/1
2 AEROSOL, METERED ORAL
Refills: 0 | DISCHARGE

## 2023-08-21 RX ORDER — INSULIN LISPRO 100/ML
VIAL (ML) SUBCUTANEOUS AT BEDTIME
Refills: 0 | Status: DISCONTINUED | OUTPATIENT
Start: 2023-08-21 | End: 2023-08-21

## 2023-08-21 RX ORDER — ALBUTEROL 90 UG/1
2 AEROSOL, METERED ORAL
Qty: 0 | Refills: 0 | DISCHARGE
Start: 2023-08-21

## 2023-08-21 RX ORDER — DEXTROSE 50 % IN WATER 50 %
15 SYRINGE (ML) INTRAVENOUS ONCE
Refills: 0 | Status: DISCONTINUED | OUTPATIENT
Start: 2023-08-21 | End: 2023-08-21

## 2023-08-21 RX ORDER — SODIUM CHLORIDE 9 MG/ML
1000 INJECTION INTRAMUSCULAR; INTRAVENOUS; SUBCUTANEOUS
Refills: 0 | Status: DISCONTINUED | OUTPATIENT
Start: 2023-08-21 | End: 2023-08-21

## 2023-08-21 RX ADMIN — SODIUM CHLORIDE 80 MILLILITER(S): 9 INJECTION INTRAMUSCULAR; INTRAVENOUS; SUBCUTANEOUS at 03:35

## 2023-08-21 RX ADMIN — CEFEPIME 100 MILLIGRAM(S): 1 INJECTION, POWDER, FOR SOLUTION INTRAMUSCULAR; INTRAVENOUS at 09:57

## 2023-08-21 RX ADMIN — Medication 400 MILLIGRAM(S): at 03:36

## 2023-08-21 RX ADMIN — Medication 1000 MILLIGRAM(S): at 03:51

## 2023-08-21 RX ADMIN — POLYETHYLENE GLYCOL 3350 17 GRAM(S): 17 POWDER, FOR SOLUTION ORAL at 06:42

## 2023-08-21 RX ADMIN — CEFEPIME 100 MILLIGRAM(S): 1 INJECTION, POWDER, FOR SOLUTION INTRAMUSCULAR; INTRAVENOUS at 02:55

## 2023-08-21 RX ADMIN — Medication 975 MILLIGRAM(S): at 18:47

## 2023-08-21 RX ADMIN — Medication 975 MILLIGRAM(S): at 17:47

## 2023-08-21 RX ADMIN — CHLORHEXIDINE GLUCONATE 15 MILLILITER(S): 213 SOLUTION TOPICAL at 17:48

## 2023-08-21 RX ADMIN — CHLORHEXIDINE GLUCONATE 15 MILLILITER(S): 213 SOLUTION TOPICAL at 06:42

## 2023-08-21 NOTE — PROGRESS NOTE ADULT - ASSESSMENT
43 year old female with DM2, asthma, allergies presented to a hospital in [place] with symptomatic anemia x 3 weeks found to have a hgb 5.5, hct 17, blood smear c/f acute leukemia. She received pRBC transfusion x 2 at the facility. Now presenting to [facility] for further evaluation.     AML   -- Blood smear from [place] w/ concern for acute leukemia + schistocytes. Coags were normal, initial hgb 5.5.  -- Peripheral smear reviewed.  No renee rods.  17% blasts.  -- Fibrinogen normal.  -- Follow up flow cytometry, PML-MCKENNA FISH  -- Monitor CBC and transfuse to maintain hg >7, platelet >10K, >15K if febrile, or >50K if bleeding- will need leukoreduced irradiated blood products. Hemoglobin 6.7 this AM, plan for 1 unit pRBC.    Neutropenic fever   -- Low-grade fever 100.2F on admission, recommend to continue broad spectrum antibiotics   -- Continue to monitor CBC with differential daily while admitted.     Clinically she appears stable. Will likely plan for discharge after transfusion with close outpatient follow-up at [facility]. Currently awaiting response from [facility] to ensure appointment is made prior to discharge.   Discussed with Dr. Garcia, will update further once plan is finalized. Will continue to follow.    Cait Herrera PA-C  Hematology/Oncology  [facility]  [phone] (office)  [phone] (alt office)  Evenings and weekends please call MD on call or office     43 year old female with DM2, asthma, allergies presented to a hospital in [place] with symptomatic anemia x 3 weeks found to have a hgb 5.5, hct 17, blood smear c/f acute leukemia. She received pRBC transfusion x 2 at the facility. Now presenting to [facility] for further evaluation.     AML   -- Blood smear from [place] w/ concern for acute leukemia + schistocytes. Coags were normal, initial hgb 5.5.  -- Peripheral smear reviewed.  No renee rods.  17% blasts.  -- Fibrinogen normal.  -- Follow up flow cytometry, PML-MCKENNA FISH  -- Monitor CBC and transfuse to maintain hg >7, platelet >10K, >15K if febrile, or >50K if bleeding- will need leukoreduced irradiated blood products. Hemoglobin 6.7 this AM, plan for 1 unit pRBC.    Neutropenic fever   -- Low-grade fever 100.2F on admission, recommend to continue empiric antibiotics   -- Continue to monitor CBC with differential daily while admitted.     Clinically she appears stable. Will likely plan for discharge after transfusion with close outpatient follow-up at [facility]. Currently awaiting response from [facility] to ensure appointment is made prior to discharge.   Discussed with Dr. Garcia, will update further once plan is finalized. Will continue to follow.    Cait Herrera PA-C  Hematology/Oncology  [facility]  [phone] (office)  [phone] (alt office)  Evenings and weekends please call MD on call or office

## 2023-08-21 NOTE — PROGRESS NOTE ADULT - PROBLEM SELECTOR PLAN 1
Immunocompromised, new diagnosis of acute myeloid leukemia; Blasts 17%, ANC of 660, Fever, oral Nhfz=073.2     -Cefepime IV 2g/Q8H  -Low suspicion of MRSA or anaerobic infection; Favor Cefepime over Vancomycin IV with Zosyn IV   -VS q4h  -CXR:  clear lungs  -UA not c/w UTI  -RVP negative   -F/U BCx and UCx Immunocompromised, new diagnosis of acute myeloid leukemia; Blasts 17%, ANC of 660, Fever, oral Vows=612.2   Lowest ANC .85, does not meet criteria for acute neutropenic fever   No overt signs of infection  d/c cefepime   VS q4h  CXR:  clear lungs  -UA not c/w UTI  -RVP negative   -F/U BCx and UCx

## 2023-08-21 NOTE — DISCHARGE NOTE NURSING/CASE MANAGEMENT/SOCIAL WORK - NSDCFUADDAPPT_GEN_ALL_CORE_FT
Please follow up at [facility] within 2 days of DC. Both of The numbers below are the office numbers of the physicians and PA who saw you.  [phone]

## 2023-08-21 NOTE — PROGRESS NOTE ADULT - PROBLEM SELECTOR PLAN 6
-Hold Heparin for DVT ppx due to new dx of AML, thrombocytopenia   -SCDs  -Monitor Plt count and for S&S of bleeding -Hold Heparin for DVT ppx due to new dx of AML, thrombocytopenia   - IMPROVEDD Score = 2  -SCDs  -Monitor Plt count and for S&S of bleeding

## 2023-08-21 NOTE — DISCHARGE NOTE PROVIDER - HOSPITAL COURSE
Patient is a 44 y/o female with PMHx T2DM, premature menopause, who presented to [facility] ED on 8/20/23 c/o fatigue, abnormal lab values. Had outpatient labs done by OBGYN last week showing HgB of 5.5, pt proceeded to out of state ED at that time and received 2 units of PRBCs. Fatigue, SOB greatly improved with initial 2 units of blood. At time of ED presentation there was initial concern for sepsis vs acute neutropenic fever, pt was started on Vanc + Zosyn which was then switched to Cefepime. Repeat labs 8/21 showed Hgb 6.9, [WILL RECIEVE/RECIEVED] 1U PRBCs. Heme/Onc Consulted ([facility]) and recommend d/c with close follow up, [appt [place] [facility] [place]?] continuation of abx outpatient with switch from cefepime to Augmentin on d/c. Low concern for acute neutropenic fever at this time and no source for infection.   Pt had COVID-19 exposure through other pts in 4 person room, RVP on admission negative, will need retesting in 3-5 days. Patient is a 43F T2DM, premature menopause, who presented to [facility] ED on 8/20/23 c/o fatigue, abnormal lab values. Had outpatient labs done by OBGYN last week showing HgB of 5.5, pt proceeded to out of state ED at that time and received 2 units of PRBCs. Fatigue, SOB greatly improved with initial 2 units of blood. At time of ED presentation there was initial concern for sepsis vs acute neutropenic fever, pt was started on Vanc + Zosyn which was then switched to Cefepime. Repeat labs 8/21 showed Hgb 6.9 and she received additional 1U PRBCs. Heme/Onc Consulted ([facility]) and recommend d/c with close follow up, [appt scheduled at [facility] for 8/22] continuation of abx outpatient with switch from cefepime to Augmentin on d/c. Low concern for acute neutropenic fever at this time and no source for infection.   Pt had COVID-19 exposure through other pts in 4 person room, RVP on admission negative, will need retesting in 3-5 days.

## 2023-08-21 NOTE — PROGRESS NOTE ADULT - ATTENDING COMMENTS
Pt seen and examined, chart and labs reviewed.  Briefly a 43F with DM2, asthma, MARIANNE on CPAP, who was recently diagnosed with AML (blasts on peripheral, new anemia/thrombocytopenia).  Today, pt continues to feel slight dizziness, understandably very nervous about diagnosis.  Pt to receive additional 1U pRBC prior to being discharged.     #Acute myeloid leukemia:  - Blasts seen on peripheral smear  - Goal Hb >7, currently receiving 1U pRBCs, goal plt >10   - Care discussed with hematology, favor continuing PO abx (Augmentin) on discharge and pt to follow up with [facility] tomorrow (appt being arranged currently).  Pt to resume workup, BM biopsy as outpt    DC time 32 minutes

## 2023-08-21 NOTE — DISCHARGE NOTE PROVIDER - NSDCCPCAREPLAN_GEN_ALL_CORE_FT
PRINCIPAL DISCHARGE DIAGNOSIS  Diagnosis: Acute myeloid leukemia  Assessment and Plan of Treatment: You received 1 unit of red blood cells for a hemoglobin of 6.9. You likely have a new diagnosis of leukemia causing low blood cells and platelets. tests were sent here on your blood to help diagnose you, which will come back over the next week. It will be very important to follow up with [facility], they should contact you with an appointment by tomorrow.  The Oncology team from [facility] saw you while here.  Please go to the [facility] ER immediately if you develop any of the following symptoms: worsening fatigue, feeling like you will faint, shortness of breath, chest pain, severe headache, any visual symptoms, anything else new or concerning. This is a specialized ER that only sees [facility] patients and you are in their system now that they have seen you here.

## 2023-08-21 NOTE — PROGRESS NOTE ADULT - ASSESSMENT
43 year old female with PMHx of Type 2 DM, Asthma, Eczema, premature menopause, and MARIANNE (on CPAP)  p/w anemia, bicytopenia with 17% blasts; Evaluated by Hematology/Oncology, new diagnosis of acute myeloid leukemia;

## 2023-08-21 NOTE — PROGRESS NOTE ADULT - PROBLEM SELECTOR PLAN 3
-Hgb of 7.5 and PLT 81K likely due to AML   -Heme-onc following, recs appreciated   -s/p 2U pRBC on 8/18    -Monitor Plt count and Hgb daily   -Monitor hemolysis labs -Hgb of 6.9  and PLT 68 likely due to AML   -Heme-onc following, recs appreciated   -s/p 2U pRBC on 8/18    -Monitor Plt count and Hgb daily   -Monitor hemolysis labs

## 2023-08-21 NOTE — DISCHARGE NOTE NURSING/CASE MANAGEMENT/SOCIAL WORK - NSDCPEFALRISK_GEN_ALL_CORE
For information on Fall & Injury Prevention, visit: [email or web address] OR  [email or web address] OR  https://www.cdc.gov/steadi/patient.html

## 2023-08-21 NOTE — PROGRESS NOTE ADULT - NS ATTEND AMEND GEN_ALL_CORE FT
Briefly, 42 y/o female admitted with new acute leukemia. Maintain HGB > 7, PLT > 10. Patient wishes to follow-up at [facility]. Plan on discharge and follow-up in clinic as soon as possible. Advised to go to the ER with any fever or new symptoms. On empiric antibiotics for low-grade fever; can transition to oral on discharge.

## 2023-08-21 NOTE — PATIENT PROFILE ADULT - FALL HARM RISK - UNIVERSAL INTERVENTIONS
Bed in lowest position, wheels locked, appropriate side rails in place/Call bell, personal items and telephone in reach/Instruct patient to call for assistance before getting out of bed or chair/Non-slip footwear when patient is out of bed/[place] to call system/Physically safe environment - no spills, clutter or unnecessary equipment/Purposeful Proactive Rounding/Room/bathroom lighting operational, light cord in reach

## 2023-08-21 NOTE — DISCHARGE NOTE PROVIDER - NSDCMRMEDTOKEN_GEN_ALL_CORE_FT
Albuterol (Eqv-ProAir HFA) 90 mcg/inh inhalation aerosol: 2 inhaled once a day as needed for  shortness of breath and/or wheezing  Benefiber (guar gum formulation) 100% oral powder: 1 orally once a day  Combipatch 0.05 mg-0.14 mg/24 hours transdermal film, extended release: 1 transdermally 2 times a week  Mounjaro 10 mg/0.5 mL subcutaneous solution: 10 subcutaneously once a week  polyethylene glycol 3350 oral kit: orally once a day   albuterol 90 mcg/inh inhalation aerosol: 2 puff(s) inhaled every 6 hours As needed for shortness of breath and/or wheezing  amoxicillin-clavulanate 875 mg-125 mg oral tablet: 1 tab(s) orally 2 times a day  Benefiber (guar gum formulation) 100% oral powder: 1 orally once a day  Combipatch 0.05 mg-0.14 mg/24 hours transdermal film, extended release: 1 transdermally 2 times a week  Mounjaro 10 mg/0.5 mL subcutaneous solution: 10 subcutaneously once a week  polyethylene glycol 3350 oral kit: orally once a day

## 2023-08-21 NOTE — PROGRESS NOTE ADULT - PROBLEM SELECTOR PLAN 2
New diagnosis; Blasts 17%, , Thrombocytopenia= 81K    -Formally evaluated by Heme-onc following, reviewed recs appreciated  -F/U fibrinogen and coags  -HgB <7 transfuse, leuko reduced  irradiated  blood products per Heme/Onc recs  -Flow Cytometry and PML-MCKENNA FISH ordered  - Monitor for bleeding  -Monitor uric acid, phos, potassium New diagnosis; Blasts 17%, , Thrombocytopenia= 81K    -Formally evaluated by Heme-onc following, reviewed recs appreciated  [facility], Dr. Ki Roa [phone]  -F/U fibrinogen and coags  -HgB <7 transfuse, leuko reduced  irradiated  blood products per Heme/Onc recs  -Flow Cytometry and PML-MCKENNA FISH ordered  - Monitor for bleeding  -Monitor uric acid, phos, potassium  - HgB 6.9 this AM, will transfuse 1 unit PRBCs  Pending heme-onc recommendations for dispo, inpatient care here vs transfer to [facility] [place] tx

## 2023-08-21 NOTE — DISCHARGE NOTE PROVIDER - NSDCFUADDAPPT_GEN_ALL_CORE_FT
Please follow up at [facility] [place] within 2 days of DC. Both of The numbers below are the office numbers of the physicians and PA who saw you.  [phone]

## 2023-08-21 NOTE — PROGRESS NOTE ADULT - SUBJECTIVE AND OBJECTIVE BOX
Patient is a 43y old  Female who presents with a chief complaint of Abnormal labs (21 Aug 2023 11:11)    Patient seen this afternoon. She feels generally well but reports some lightheadedness. No other complaints at this time.   Hg 6.7 this AM, plan for 1 unit pRBC transfusion.     MEDICATIONS  (STANDING):  chlorhexidine 0.12% Liquid 15 milliLiter(s) Oral Mucosa every 12 hours  dextrose 5%. 1000 milliLiter(s) (100 mL/Hr) IV Continuous <Continuous>  dextrose 5%. 1000 milliLiter(s) (50 mL/Hr) IV Continuous <Continuous>  dextrose 50% Injectable 25 Gram(s) IV Push once  dextrose 50% Injectable 12.5 Gram(s) IV Push once  dextrose 50% Injectable 25 Gram(s) IV Push once  glucagon  Injectable 1 milliGRAM(s) IntraMuscular once  insulin lispro (ADMELOG) corrective regimen sliding scale   SubCutaneous three times a day before meals  insulin lispro (ADMELOG) corrective regimen sliding scale   SubCutaneous at bedtime    MEDICATIONS  (PRN):  albuterol    90 MICROgram(s) HFA Inhaler 2 Puff(s) Inhalation every 6 hours PRN for shortness of breath and/or wheezing  dextrose Oral Gel 15 Gram(s) Oral once PRN Blood Glucose LESS THAN 70 milliGRAM(s)/deciliter        Vital Signs Last 24 Hrs  T(C): 36.7 (21 Aug 2023 10:00), Max: 37.9 (20 Aug 2023 16:00)  T(F): 98 (21 Aug 2023 10:00), Max: 100.2 (20 Aug 2023 16:00)  HR: 82 (21 Aug 2023 10:44) (78 - 110)  BP: 102/65 (21 Aug 2023 10:00) (102/65 - 131/81)  BP(mean): --  RR: 18 (21 Aug 2023 10:00) (16 - 18)  SpO2: 96% (21 Aug 2023 10:44) (96% - 100%)    Parameters below as of 21 Aug 2023 10:00  Patient On (Oxygen Delivery Method): room air        PE  NAD  Awake, alert  Anicteric, MMM  No c/c/e  No rash grossly  FROM                          6.9    5.05  )-----------( 68       ( 21 Aug 2023 07:00 )             20.8       08-21    136  |  102  |  10  ----------------------------<  88  4.0   |  23  |  0.71    Ca    8.4      21 Aug 2023 07:00  Phos  4.9     08-21  Mg     2.30     08-21    TPro  9.3<H>  /  Alb  3.7  /  TBili  0.3  /  DBili  x   /  AST  30  /  ALT  24  /  AlkPhos  73  08-20      
Ana Tse, MS4    PROGRESS NOTE:     Patient is a 43y old  Female who presents with a chief complaint of Abnormal labs (20 Aug 2023 22:45)      MAJOR INTERVAL HOSPITAL EVENTS:     SUBJECTIVE / OVERNIGHT EVENTS: No acute overnight events. Pt states that she is feeling well, majority of her sx remain improved after blood transfusion that she received in [place]. Only complaint is intermittent diarrhea, first occurring Saturday night (8/20) and 2 recurrent episodes last night with watery stool. Denies any blood or mucus in stool or associated abdominal pain. Got cpap last night which improved her sleep.   Wants to be transferred to [facility] [place] [place] for leukemia tx.     MEDICATIONS  (STANDING):  chlorhexidine 0.12% Liquid 15 milliLiter(s) Oral Mucosa every 12 hours  dextrose 5%. 1000 milliLiter(s) (100 mL/Hr) IV Continuous <Continuous>  dextrose 5%. 1000 milliLiter(s) (50 mL/Hr) IV Continuous <Continuous>  dextrose 50% Injectable 25 Gram(s) IV Push once  dextrose 50% Injectable 25 Gram(s) IV Push once  dextrose 50% Injectable 12.5 Gram(s) IV Push once  glucagon  Injectable 1 milliGRAM(s) IntraMuscular once  insulin lispro (ADMELOG) corrective regimen sliding scale   SubCutaneous three times a day before meals  insulin lispro (ADMELOG) corrective regimen sliding scale   SubCutaneous at bedtime    MEDICATIONS  (PRN):  albuterol    90 MICROgram(s) HFA Inhaler 2 Puff(s) Inhalation every 6 hours PRN for shortness of breath and/or wheezing  dextrose Oral Gel 15 Gram(s) Oral once PRN Blood Glucose LESS THAN 70 milliGRAM(s)/deciliter      CAPILLARY BLOOD GLUCOSE      POCT Blood Glucose.: 98 mg/dL (21 Aug 2023 08:47)  POCT Blood Glucose.: 94 mg/dL (20 Aug 2023 23:32)  POCT Blood Glucose.: 94 mg/dL (20 Aug 2023 14:27)    I&O's Summary    20 Aug 2023 07:01  -  21 Aug 2023 07:00  --------------------------------------------------------  IN: 640 mL / OUT: 0 mL / NET: 640 mL        PHYSICAL EXAM:  Vital Signs Last 24 Hrs  T(C): 36.7 (21 Aug 2023 10:00), Max: 37.9 (20 Aug 2023 16:00)  T(F): 98 (21 Aug 2023 10:00), Max: 100.2 (20 Aug 2023 16:00)  HR: 82 (21 Aug 2023 10:44) (78 - 110)  BP: 102/65 (21 Aug 2023 10:00) (102/65 - 131/81)  BP(mean): --  RR: 18 (21 Aug 2023 10:00) (16 - 18)  SpO2: 96% (21 Aug 2023 10:44) (96% - 100%)    Parameters below as of 21 Aug 2023 10:00  Patient On (Oxygen Delivery Method): room air        GENERAL: No acute distress, well-developed  HEAD:  Atraumatic, Normocephalic  EYES: EOMI, PERRLA, conjunctiva and sclera clear  NECK: Supple, no lymphadenopathy, no JVD  CHEST/LUNG: CTAB; No wheezes, rales, or rhonchi  HEART: Regular rate and rhythm; Normal s1 and s2, No murmurs, rubs, or gallops  ABDOMEN: Soft, non-tender, non-distended; normal bowel sounds, no organomegaly  EXTREMITIES:  2+ peripheral pulses b/l, No clubbing, cyanosis, or edema  NEUROLOGY: A&O x 3, no focal deficits  SKIN: No rashes or lesions      LABS:                        6.9    5.05  )-----------( 68       ( 21 Aug 2023 07:00 )             20.8     08-21    136  |  102  |  10  ----------------------------<  88  4.0   |  23  |  0.71    Ca    8.4      21 Aug 2023 07:00  Phos  4.9     08-21  Mg     2.30     08-21    TPro  9.3<H>  /  Alb  3.7  /  TBili  0.3  /  DBili  x   /  AST  30  /  ALT  24  /  AlkPhos  73  08-20    PT/INR - ( 21 Aug 2023 07:00 )   PT: 14.3 sec;   INR: 1.29 ratio         PTT - ( 21 Aug 2023 07:00 )  PTT:26.6 sec      Urinalysis Basic - ( 21 Aug 2023 07:00 )    Color: x / Appearance: x / SG: x / pH: x  Gluc: 88 mg/dL / Ketone: x  / Bili: x / Urobili: x   Blood: x / Protein: x / Nitrite: x   Leuk Esterase: x / RBC: x / WBC x   Sq Epi: x / Non Sq Epi: x / Bacteria: x          RADIOLOGY & ADDITIONAL TESTS:  Results Reviewed:   Imaging Personally Reviewed:  Electrocardiogram Personally Reviewed:

## 2023-08-24 LAB
FIBRINOGEN AG PPP IA-MCNC: 770 MG/DL — HIGH (ref 206–478)
PMLR FINAL DIAGNOSIS: SIGNIFICANT CHANGE UP
PMLR SPECIMEN TYPE: SIGNIFICANT CHANGE UP

## 2023-08-25 LAB
CULTURE RESULTS: SIGNIFICANT CHANGE UP
CULTURE RESULTS: SIGNIFICANT CHANGE UP
SPECIMEN SOURCE: SIGNIFICANT CHANGE UP
SPECIMEN SOURCE: SIGNIFICANT CHANGE UP

## 2024-04-25 NOTE — DISCHARGE NOTE NURSING/CASE MANAGEMENT/SOCIAL WORK - PATIENT PORTAL LINK FT
Received Renewal request via [employer]  for budesonide (PULMICORT) 0.25 MG/2ML Suspension . (Quantity:60 ml, Day Supply:15)     Insurance: [employer]  Member ID:  [id number]  BIN: [id number]  PCN: [id number]  Group: [id number]     Ran Test claim via [employer] medication Pays for a $58.39 copay. Will outreach to patient to offer specialty pharmacy services and or release to preferred pharmacy    The only NDC we have available at this time through our supplier is 9738-8893-33   (Brand name Pulmicort)    [facility]   Pharmacy Liaison  [phone]  4/25/2024 10:18 AM      
You can access the FollowMyHealth Patient Portal offered by [facility] by registering at the following website: [email or web address]. By joining [employer]’s FollowMyHealth portal, you will also be able to view your health information using other applications (apps) compatible with our system.

## 2025-06-12 ENCOUNTER — APPOINTMENT (OUTPATIENT)
Dept: PULMONOLOGY | Facility: CLINIC | Age: 45
End: 2025-06-12
Payer: COMMERCIAL

## 2025-06-12 VITALS — SYSTOLIC BLOOD PRESSURE: 128 MMHG | OXYGEN SATURATION: 98 % | DIASTOLIC BLOOD PRESSURE: 90 MMHG | HEART RATE: 118 BPM

## 2025-06-12 PROBLEM — J45.909 ASTHMA: Status: ACTIVE | Noted: 2025-06-12

## 2025-06-12 PROCEDURE — 94727 GAS DIL/WSHOT DETER LNG VOL: CPT

## 2025-06-12 PROCEDURE — 99204 OFFICE O/P NEW MOD 45 MIN: CPT | Mod: 25

## 2025-06-12 PROCEDURE — 94729 DIFFUSING CAPACITY: CPT

## 2025-06-12 PROCEDURE — ZZZZZ: CPT

## 2025-06-12 PROCEDURE — 88738 HGB QUANT TRANSCUTANEOUS: CPT

## 2025-06-12 PROCEDURE — 94060 EVALUATION OF WHEEZING: CPT

## 2025-06-15 LAB — POCT - HEMOGLOBIN (HGB), QUANTITATIVE, TRANSCUTANEOUS: 10

## 2025-07-16 ENCOUNTER — APPOINTMENT (OUTPATIENT)
Dept: PULMONOLOGY | Facility: CLINIC | Age: 45
End: 2025-07-16

## 2025-08-01 ENCOUNTER — APPOINTMENT (OUTPATIENT)
Dept: PULMONOLOGY | Facility: CLINIC | Age: 45
End: 2025-08-01

## 2025-08-01 PROCEDURE — 94375 RESPIRATORY FLOW VOLUME LOOP: CPT

## 2025-08-01 PROCEDURE — 94621 CARDIOPULM EXERCISE TESTING: CPT

## 2025-08-04 DIAGNOSIS — R06.09 OTHER FORMS OF DYSPNEA: ICD-10-CM

## 2025-09-03 ENCOUNTER — APPOINTMENT (OUTPATIENT)
Dept: PULMONOLOGY | Facility: CLINIC | Age: 45
End: 2025-09-03
Payer: COMMERCIAL

## 2025-09-03 VITALS
WEIGHT: 181 LBS | RESPIRATION RATE: 16 BRPM | SYSTOLIC BLOOD PRESSURE: 135 MMHG | DIASTOLIC BLOOD PRESSURE: 90 MMHG | HEIGHT: 68 IN | BODY MASS INDEX: 27.43 KG/M2 | OXYGEN SATURATION: 96 % | HEART RATE: 101 BPM

## 2025-09-03 DIAGNOSIS — R06.09 OTHER FORMS OF DYSPNEA: ICD-10-CM

## 2025-09-03 DIAGNOSIS — J45.909 UNSPECIFIED ASTHMA, UNCOMPLICATED: ICD-10-CM

## 2025-09-03 DIAGNOSIS — I26.99 OTHER PULMONARY EMBOLISM W/OUT ACUTE COR PULMONALE: ICD-10-CM

## 2025-09-03 PROCEDURE — 99214 OFFICE O/P EST MOD 30 MIN: CPT

## 2025-09-03 PROCEDURE — G2211 COMPLEX E/M VISIT ADD ON: CPT

## 2025-09-06 PROBLEM — I26.99 PULMONARY EMBOLI: Status: ACTIVE | Noted: 2025-09-06
